# Patient Record
Sex: MALE | Race: WHITE | NOT HISPANIC OR LATINO | Employment: FULL TIME | URBAN - METROPOLITAN AREA
[De-identification: names, ages, dates, MRNs, and addresses within clinical notes are randomized per-mention and may not be internally consistent; named-entity substitution may affect disease eponyms.]

---

## 2017-08-10 ENCOUNTER — TELEPHONE (OUTPATIENT)
Dept: FAMILY MEDICINE | Facility: CLINIC | Age: 57
End: 2017-08-10

## 2017-08-10 DIAGNOSIS — N20.0 RENAL CALCULUS: Primary | ICD-10-CM

## 2017-08-10 DIAGNOSIS — N20.0 RENAL CALCULUS: ICD-10-CM

## 2017-08-10 LAB
ALBUMIN SERPL-MCNC: 4 G/DL (ref 3.4–5)
ALP SERPL-CCNC: 57 U/L (ref 40–150)
ALT SERPL W P-5'-P-CCNC: 38 U/L (ref 0–70)
ANION GAP SERPL CALCULATED.3IONS-SCNC: 9 MMOL/L (ref 3–14)
AST SERPL W P-5'-P-CCNC: 36 U/L (ref 0–45)
BILIRUB SERPL-MCNC: 0.6 MG/DL (ref 0.2–1.3)
BUN SERPL-MCNC: 14 MG/DL (ref 7–30)
CALCIUM SERPL-MCNC: 9.4 MG/DL (ref 8.5–10.1)
CHLORIDE SERPL-SCNC: 104 MMOL/L (ref 94–109)
CO2 SERPL-SCNC: 28 MMOL/L (ref 20–32)
CREAT SERPL-MCNC: 1.07 MG/DL (ref 0.66–1.25)
DEPRECATED CALCIDIOL+CALCIFEROL SERPL-MC: 43 UG/L (ref 20–75)
ERYTHROCYTE [DISTWIDTH] IN BLOOD BY AUTOMATED COUNT: 14.8 % (ref 10–15)
FERRITIN SERPL-MCNC: 12 NG/ML (ref 26–388)
GFR SERPL CREATININE-BSD FRML MDRD: 71 ML/MIN/1.7M2
GLUCOSE SERPL-MCNC: 88 MG/DL (ref 70–99)
HCT VFR BLD AUTO: 46.1 % (ref 40–53)
HGB BLD-MCNC: 14.6 G/DL (ref 13.3–17.7)
IRON SATN MFR SERPL: 19 % (ref 15–46)
IRON SERPL-MCNC: 75 UG/DL (ref 35–180)
MCH RBC QN AUTO: 28.5 PG (ref 26.5–33)
MCHC RBC AUTO-ENTMCNC: 31.7 G/DL (ref 31.5–36.5)
MCV RBC AUTO: 90 FL (ref 78–100)
PLATELET # BLD AUTO: 262 10E9/L (ref 150–450)
POTASSIUM SERPL-SCNC: 4 MMOL/L (ref 3.4–5.3)
PROT SERPL-MCNC: 7.7 G/DL (ref 6.8–8.8)
RBC # BLD AUTO: 5.13 10E12/L (ref 4.4–5.9)
SODIUM SERPL-SCNC: 141 MMOL/L (ref 133–144)
TIBC SERPL-MCNC: 397 UG/DL (ref 240–430)
VIT B12 SERPL-MCNC: 1075 PG/ML (ref 193–986)
WBC # BLD AUTO: 6.2 10E9/L (ref 4–11)

## 2017-08-10 PROCEDURE — 80053 COMPREHEN METABOLIC PANEL: CPT | Performed by: FAMILY MEDICINE

## 2017-08-10 PROCEDURE — 36415 COLL VENOUS BLD VENIPUNCTURE: CPT | Performed by: FAMILY MEDICINE

## 2017-08-10 PROCEDURE — 82607 VITAMIN B-12: CPT | Performed by: FAMILY MEDICINE

## 2017-08-10 PROCEDURE — 83550 IRON BINDING TEST: CPT | Performed by: FAMILY MEDICINE

## 2017-08-10 PROCEDURE — 82306 VITAMIN D 25 HYDROXY: CPT | Performed by: FAMILY MEDICINE

## 2017-08-10 PROCEDURE — 85027 COMPLETE CBC AUTOMATED: CPT | Performed by: FAMILY MEDICINE

## 2017-08-10 PROCEDURE — 82728 ASSAY OF FERRITIN: CPT | Performed by: FAMILY MEDICINE

## 2017-08-10 PROCEDURE — 83540 ASSAY OF IRON: CPT | Performed by: FAMILY MEDICINE

## 2017-08-10 NOTE — TELEPHONE ENCOUNTER
Called patient per Dr. Rebolledo and set up a lab appointment and a physical with him.  No further action is needed.     Yuko Da Silva, DANIELLE

## 2017-08-18 ENCOUNTER — TELEPHONE (OUTPATIENT)
Dept: FAMILY MEDICINE | Facility: CLINIC | Age: 57
End: 2017-08-18

## 2017-08-18 NOTE — TELEPHONE ENCOUNTER
"Reason for Call:  Same Day Appointment, Requested Provider:  Hakeem Rebolledo MD    PCP: Patrice Birch    Reason for visit: work in-physical, patient had an appointment on 08/24 but is unable to make it, requesting to be worked in sometime that week because \"its just a simple physical\" and has already had his blood drawn. Please advise    Duration of symptoms:     Have you been treated for this in the past? Yes    Additional comments:     Can we leave a detailed message on this number? YES    Phone number patient can be reached at: Home number on file [phone] (home)    Best Time: any    Call taken on 8/18/2017 at 8:35 AM by Britt Livingston    "

## 2017-08-18 NOTE — TELEPHONE ENCOUNTER
Patient returned call. Scheduled him for Dr. Rebolledo's next available on 9/19 for physical.     Thank you  Juan Redman  Patient Representative

## 2017-08-18 NOTE — TELEPHONE ENCOUNTER
Called patient and left a voicemail to call the clinic back, when he calls back please let him know that we are booked that week and we really wish he would of kept his appointment as the next opening for a physical is in the middle of September.  Please schedule a physical for the patient as soon as possible.       Yuko Da Silva, DANIELLE

## 2017-09-06 ENCOUNTER — TELEPHONE (OUTPATIENT)
Dept: FAMILY MEDICINE | Facility: CLINIC | Age: 57
End: 2017-09-06

## 2017-09-19 ENCOUNTER — OFFICE VISIT (OUTPATIENT)
Dept: FAMILY MEDICINE | Facility: CLINIC | Age: 57
End: 2017-09-19
Payer: COMMERCIAL

## 2017-09-19 VITALS
TEMPERATURE: 97 F | HEART RATE: 91 BPM | DIASTOLIC BLOOD PRESSURE: 76 MMHG | BODY MASS INDEX: 35.76 KG/M2 | WEIGHT: 249.8 LBS | HEIGHT: 70 IN | SYSTOLIC BLOOD PRESSURE: 110 MMHG | OXYGEN SATURATION: 96 %

## 2017-09-19 DIAGNOSIS — D50.9 IRON DEFICIENCY ANEMIA, UNSPECIFIED IRON DEFICIENCY ANEMIA TYPE: ICD-10-CM

## 2017-09-19 DIAGNOSIS — Z98.84 GASTRIC BYPASS STATUS FOR OBESITY: ICD-10-CM

## 2017-09-19 DIAGNOSIS — Z00.00 ROUTINE GENERAL MEDICAL EXAMINATION AT A HEALTH CARE FACILITY: Primary | ICD-10-CM

## 2017-09-19 PROCEDURE — 99396 PREV VISIT EST AGE 40-64: CPT | Performed by: FAMILY MEDICINE

## 2017-09-19 ASSESSMENT — PAIN SCALES - GENERAL: PAINLEVEL: NO PAIN (0)

## 2017-09-19 NOTE — PROGRESS NOTES
SUBJECTIVE:   CC: Blake Gibbs is an 57 year old male who presents for preventative health visit.     Healthy Habits:    Do you get at least three servings of calcium containing foods daily (dairy, green leafy vegetables, etc.)? yes    Amount of exercise or daily activities, outside of work: 2-3 hour(s) per day    Problems taking medications regularly No    Medication side effects: No    Have you had an eye exam in the past two years? yes    Do you see a dentist twice per year? yes    Do you have sleep apnea, excessive snoring or daytime drowsiness?no              Today's PHQ-2 Score:   PHQ-2 ( 1999 [employer]) 9/19/2017 12/11/2015   Q1: Little interest or pleasure in doing things 0 0   Q2: Feeling down, depressed or hopeless 0 0   PHQ-2 Score 0 0         Abuse: Current or Past(Physical, Sexual or Emotional)- No  Do you feel safe in your environment - Yes  Social History   Substance Use Topics     Smoking status: Former Smoker     Years: 24.00     Smokeless tobacco: Never Used      Comment: quit at 18yrs old     Alcohol use No     The patient does not drink >3 drinks per day nor >7 drinks per week.    Last PSA:   PSA   Date Value Ref Range Status   07/22/2009 0.43 0 - 4 ug/L Final       Reviewed orders with patient. Reviewed health maintenance and updated orders accordingly - Yes      Reviewed and updated as needed this visit by clinical staff  Tobacco  Allergies  Meds         Reviewed and updated as needed this visit by Provider            ROS:  C: NEGATIVE for fever, chills, change in weight  I: NEGATIVE for worrisome rashes, moles or lesions  E: NEGATIVE for vision changes or irritation  ENT: NEGATIVE for ear, mouth and throat problems  R: NEGATIVE for significant cough or SOB  CV: NEGATIVE for chest pain, palpitations or peripheral edema  GI: NEGATIVE for nausea, abdominal pain, heartburn, or change in bowel habits   male: negative for dysuria, hematuria, decreased urinary stream, erectile dysfunction,  "urethral discharge  M: NEGATIVE for significant arthralgias or myalgia  N: NEGATIVE for weakness, dizziness or paresthesias  P: NEGATIVE for changes in mood or affect    OBJECTIVE:   /76 (BP Location: Right arm, Patient Position: Chair, Cuff Size: Adult Regular)  Pulse 91  Temp 97  F (36.1  C) (Temporal)  Ht 5' 9.88\" (1.775 m)  Wt 249 lb 12.8 oz (113.3 kg)  SpO2 96%  BMI 35.96 kg/m2  EXAM:  GENERAL: healthy, alert and no distress  EYES: Eyes grossly normal to inspection, PERRL and conjunctivae and sclerae normal  HENT: ear canals and TM's normal, nose and mouth without ulcers or lesions  NECK: no adenopathy, no asymmetry, masses, or scars and thyroid normal to palpation  RESP: lungs clear to auscultation - no rales, rhonchi or wheezes  CV: regular rate and rhythm, normal S1 S2, no S3 or S4, no murmur, click or rub, no peripheral edema, and peripheral pulses strong  ABDOMEN: soft, nontender, no hepatosplenomegaly, no masses and bowel sounds normal  MS: no gross musculoskeletal defects noted, no edema  SKIN: no suspicious lesions or rashes  NEURO: Normal strength and tone, mentation intact and speech normal  PSYCH: mentation appears normal, affect normal/bright    ASSESSMENT/PLAN:       ICD-10-CM    1. Routine general medical examination at a health care facility Z00.00    2. Iron deficiency anemia, unspecified iron deficiency anemia type D50.9 GASTROENTEROLOGY ADULT REF PROCEDURE ONLY   3. Gastric bypass status for obesity Z98.84        The patient returns for an annual. He is doing well. In point. Immunizations and cancer screening were updated. I would like him to do upper and lower endoscopy due to his history of iron deficiency anemia. This has resolved and may simply be absorption related, but I'm not convinced. He has had negative fecal occult blood testing in the past. He did have a negative colonoscopy in 2010. But a comprehensive workup of undifferentiated iron deficiency anemia and a 57-year-old " "should include endoscopy. He agrees. Continue serial lab monitoring of his bypass status every 4-6 months.  COUNSELING:  Reviewed preventive health counseling, as reflected in patient instructions         reports that he has quit smoking. He quit after 24.00 years of use. He has never used smokeless tobacco.    Estimated body mass index is 35.96 kg/(m^2) as calculated from the following:    Height as of this encounter: 5' 9.88\" (1.775 m).    Weight as of this encounter: 249 lb 12.8 oz (113.3 kg).   They    Counseling Resources:  ATP IV Guidelines  Pooled Cohorts Equation Calculator  FRAX Risk Assessment  ICSI Preventive Guidelines  Dietary Guidelines for Americans, 2010  USDA's MyPlate  ASA Prophylaxis  Lung CA Screening    Hakeem Rebolledo MD  [facility]  "

## 2017-09-19 NOTE — NURSING NOTE
"Chief Complaint   Patient presents with     Physical       Initial /76 (BP Location: Right arm, Patient Position: Chair, Cuff Size: Adult Regular)  Pulse 91  Temp 97  F (36.1  C) (Temporal)  Ht 5' 9.88\" (1.775 m)  Wt 249 lb 12.8 oz (113.3 kg)  SpO2 96%  BMI 35.96 kg/m2 Estimated body mass index is 35.96 kg/(m^2) as calculated from the following:    Height as of this encounter: 5' 9.88\" (1.775 m).    Weight as of this encounter: 249 lb 12.8 oz (113.3 kg).  Medication Reconciliation: complete   Yuko Da Silva CMA    .  Health Maintenance Due   Topic Date Due     HEPATITIS C SCREENING  05/31/1978     ADVANCE DIRECTIVE PLANNING Q5 YRS  05/31/2015     INFLUENZA VACCINE (SYSTEM ASSIGNED)  09/01/2017       Health Maintenance reviewed at today's visit patient asked to schedule/complete:   Patient is aware.       "

## 2017-09-19 NOTE — MR AVS SNAPSHOT
After Visit Summary   9/19/2017    Blake Gibbs    MRN: [id number]           Patient Information     Date Of Birth          1960        Visit Information        Provider Department      9/19/2017 2:00 PM Hakeem Rebolledo MD [facility]        Today's Diagnoses     Routine general medical examination at a health care facility    -  1    Iron deficiency anemia, unspecified iron deficiency anemia type        Gastric bypass status for obesity          Care Instructions      Preventive Health Recommendations  Male Ages 50 - 64    Yearly exam:             See your health care provider every year in order to  o   Review health changes.   o   Discuss preventive care.    o   Review your medicines if your doctor has prescribed any.     Have a cholesterol test every 5 years, or more frequently if you are at risk for high cholesterol/heart disease.     Have a diabetes test (fasting glucose) every three years. If you are at risk for diabetes, you should have this test more often.     Have a colonoscopy at age 50, or have a yearly FIT test (stool test). These exams will check for colon cancer.      Talk with your health care provider about whether or not a prostate cancer screening test (PSA) is right for you.    You should be tested each year for STDs (sexually transmitted diseases), if you re at risk.     Shots: Get a flu shot each year. Get a tetanus shot every 10 years.     Nutrition:    Eat at least 5 servings of fruits and vegetables daily.     Eat whole-grain bread, whole-wheat pasta and brown rice instead of white grains and rice.     Talk to your provider about Calcium and Vitamin D.     Lifestyle    Exercise for at least 150 minutes a week (30 minutes a day, 5 days a week). This will help you control your weight and prevent disease.     Limit alcohol to one drink per day.     No smoking.     Wear sunscreen to prevent skin cancer.     See your dentist every six months for an  exam and cleaning.     See your eye doctor every 1 to 2 years.            Follow-ups after your visit        Additional Services     GASTROENTEROLOGY ADULT REF PROCEDURE ONLY       Last Lab Result: Creatinine (mg/dL)       Date                     Value                 08/10/2017               1.07             ----------  Body mass index is 35.96 kg/(m^2).     Needed:  No  Language:  English    Patient will be contacted to schedule procedure.     Please be aware that coverage of these services is subject to the terms and limitations of your health insurance plan.  Call member services at your health plan with any benefit or coverage questions.  Any procedures must be performed at a [facility] facility OR coordinated by your clinic's referral office.    Please bring the following with you to your appointment:    (1) Any X-Rays, CTs or MRIs which have been performed.  Contact the facility where they were done to arrange for  prior to your scheduled appointment.    (2) List of current medications   (3) This referral request   (4) Any documents/labs given to you for this referral                  Future tests that were ordered for you today     Open Future Orders        Priority Expected Expires Ordered    Hemoglobin Routine  9/19/2018 9/19/2017    Basic metabolic panel Routine  9/19/2018 9/19/2017    Vitamin B12 Routine  9/19/2018 9/19/2017    Vitamin D Deficiency Routine  9/19/2018 9/19/2017            Who to contact     If you have questions or need follow up information about today's clinic visit or your schedule please contact [facility] directly at [phone].  Normal or non-critical lab and imaging results will be communicated to you by MyChart, letter or phone within 4 business days after the clinic has received the results. If you do not hear from us within 7 days, please contact the clinic through MyChart or phone. If you have a critical or abnormal lab result, we will notify  "you by phone as soon as possible.  Submit refill requests through [employer] or call your pharmacy and they will forward the refill request to us. Please allow 3 business days for your refill to be completed.          Additional Information About Your Visit        [employer] [place] Information     [employer] lets you send messages to your doctor, view your test results, renew your prescriptions, schedule appointments and more. To sign up, go to [email or web address] . Click on \"Log in\" on the left side of the screen, which will take you to the Welcome page. Then click on \"Sign up Now\" on the right side of the page.     You will be asked to enter the access code listed below, as well as some personal information. Please follow the directions to create your username and password.     Your access code is: [email or web address]  Expires: 2017  4:10 PM     Your access code will  in 90 days. If you need help or a new code, please call your [place] clinic or [phone].        Care EveryWhere ID     This is your Care EveryWhere ID. This could be used by other organizations to access your [place] medical records  [id number]        Your Vitals Were     Pulse Temperature Height Pulse Oximetry BMI (Body Mass Index)       91 97  F (36.1  C) (Temporal) 5' 9.88\" (1.775 m) 96% 35.96 kg/m2        Blood Pressure from Last 3 Encounters:   17 110/76   16 118/80   12/11/15 106/70    Weight from Last 3 Encounters:   17 249 lb 12.8 oz (113.3 kg)   16 265 lb (120.2 kg)   12/11/15 260 lb 6.4 oz (118.1 kg)              We Performed the Following     GASTROENTEROLOGY ADULT REF PROCEDURE ONLY          Today's Medication Changes          These changes are accurate as of: 17  4:10 PM.  If you have any questions, ask your nurse or doctor.               Stop taking these medicines if you haven't already. Please contact your care team if you have questions.     atovaquone-proguanil 250-100 MG per tablet   Commonly known as: "  MALARONE   Stopped by:  Hakeem Rebolledo MD           ferrous gluconate 324 (38 FE) MG tablet   Commonly known as:  FERGON   Stopped by:  Hakeem Rebolledo MD                    Primary Care Provider Office Phone # Fax #    Patrice Birch [phone]       [facility] [address] [place] [address]        Equal Access to Services     [facility]: Hadii aad ku hadasho Soomaali, waaxda luqadaha, qaybta kaalmada adeegyada, waxay idiin hayaan adeeg kharash la'aan ah. So [facility] [phone].    ATENCIÓN: Si harsh langford, tiene a hernandez disposición servicios gratuitos de asistencia lingüística. Llame al [phone].    We comply with applicable federal civil rights laws and [place] laws. We do not discriminate on the basis of race, color, national origin, age, disability sex, sexual orientation or gender identity.            Thank you!     Thank you for choosing [facility]  for your care. Our goal is always to provide you with excellent care. Hearing back from our patients is one way we can continue to improve our services. Please take a few minutes to complete the written survey that you may receive in the mail after your visit with us. Thank you!             Your Updated Medication List - Protect others around you: Learn how to safely use, store and throw away your medicines at www.disposemymeds.org.          This list is accurate as of: 9/19/17  4:10 PM.  Always use your most recent med list.                   Brand Name Dispense Instructions for use Diagnosis    aspirin 81 MG EC tablet      Take 1 tablet (81 mg) by mouth daily        Calcium-Vitamin D3 600-500 MG-UNIT Caps           cyabnocobalamin 2500 MCG sublingual tablet    VITAMIN B-12     Place 2,500 mcg under the tongue daily        Glucosamine Chondroit-Collagen Caps           MULTIVITAMINS PO      Take  by mouth.        omeprazole 40 MG capsule    priLOSEC    90 capsule    Take 1 capsule  (40 mg) by mouth daily    Gastroesophageal reflux disease without esophagitis       vitamin E 400 UNIT capsule      Take by mouth daily

## 2017-09-21 ENCOUNTER — TELEPHONE (OUTPATIENT)
Dept: FAMILY MEDICINE | Facility: CLINIC | Age: 57
End: 2017-09-21

## 2017-09-21 NOTE — TELEPHONE ENCOUNTER
Left message for patient to return call to schedule colonoscopy or EGD. If Mary or Luz Maria are unavailable, please transfer to the surgery center.     OK to schedule with alisa or Mendez

## 2017-09-22 NOTE — TELEPHONE ENCOUNTER
Called patient to schedule colonoscopy and EGD. Patients father just passed away and he will call when he is ready.

## 2018-02-01 ENCOUNTER — OFFICE VISIT (OUTPATIENT)
Dept: FAMILY MEDICINE | Facility: CLINIC | Age: 58
End: 2018-02-01
Payer: COMMERCIAL

## 2018-02-01 VITALS
TEMPERATURE: 98 F | WEIGHT: 249.2 LBS | HEART RATE: 87 BPM | OXYGEN SATURATION: 96 % | BODY MASS INDEX: 34.89 KG/M2 | SYSTOLIC BLOOD PRESSURE: 120 MMHG | DIASTOLIC BLOOD PRESSURE: 82 MMHG | HEIGHT: 71 IN

## 2018-02-01 DIAGNOSIS — R50.9 FEVER, UNSPECIFIED FEVER CAUSE: ICD-10-CM

## 2018-02-01 DIAGNOSIS — H10.31 ACUTE CONJUNCTIVITIS OF RIGHT EYE, UNSPECIFIED ACUTE CONJUNCTIVITIS TYPE: ICD-10-CM

## 2018-02-01 DIAGNOSIS — J18.9 ATYPICAL PNEUMONIA: Primary | ICD-10-CM

## 2018-02-01 LAB
FLUAV+FLUBV AG SPEC QL: NEGATIVE
FLUAV+FLUBV AG SPEC QL: NEGATIVE
SPECIMEN SOURCE: NORMAL

## 2018-02-01 PROCEDURE — 87804 INFLUENZA ASSAY W/OPTIC: CPT | Performed by: FAMILY MEDICINE

## 2018-02-01 RX ORDER — AZITHROMYCIN 250 MG/1
TABLET, FILM COATED ORAL
Qty: 6 TABLET | Refills: 0 | Status: SHIPPED | OUTPATIENT
Start: 2018-02-01 | End: 2018-09-12

## 2018-02-01 RX ORDER — POLYMYXIN B SULFATE AND TRIMETHOPRIM 1; 10000 MG/ML; [USP'U]/ML
1 SOLUTION OPHTHALMIC
Qty: 1 BOTTLE | Refills: 0 | Status: SHIPPED | OUTPATIENT
Start: 2018-02-01 | End: 2018-02-08

## 2018-02-01 RX ORDER — PREDNISONE 20 MG/1
20 TABLET ORAL DAILY
Qty: 5 TABLET | Refills: 0 | Status: SHIPPED | OUTPATIENT
Start: 2018-02-01 | End: 2018-09-12

## 2018-02-01 ASSESSMENT — PAIN SCALES - GENERAL: PAINLEVEL: NO PAIN (0)

## 2018-02-01 NOTE — PROGRESS NOTES
"  SUBJECTIVE:                                                    Blake Gibbs is a 57 year old male who presents to clinic today for the following health issues:    Chief Complaint   Patient presents with     Cough        Acute Illness   Acute illness concerns: cough  Onset: 1 weeks    Fever: YES- low grade    Chills/Sweats: YES    Headache (location?): YES    Sinus Pressure:YES    Conjunctivitis:  YES: right and is also red slight swelling on lower lid    Ear Pain: YES- pressure    Rhinorrhea: YES    Congestion: YES    Sore Throat: YES     Cough: YES-productive of green sputum    Wheeze: YES- sometimes    Decreased Appetite: YES    Nausea: no     Vomiting: no    Diarrhea:  no    Dysuria/Freq.: no    Fatigue/Achiness: YES    Sick/Strep Exposure: YES- grandkids are sick     Therapies Tried and outcome: n/a    Prod cough for past week, +chills, not getting much better,     ROS:  Constitutional, HEENT, cardiovascular, pulmonary, gi and gu systems are negative, except as otherwise noted.    OBJECTIVE:                                                    /82 (BP Location: Right arm, Patient Position: Chair, Cuff Size: Adult Regular)  Pulse 87  Temp 98  F (36.7  C) (Temporal)  Ht 5' 10.7\" (1.796 m)  Wt 249 lb 3.2 oz (113 kg)  SpO2 96%  BMI 35.05 kg/m2  Body mass index is 35.05 kg/(m^2).    Well-appearing.  Good historian.  Alert and oriented.  Neck supple lymphadenopathy or thyromegaly.  Heart regular.  Lungs have a right sided extra wheezes with rhonchi, left side normal.  No increased work of breathing.  Extremities warm well perfused no edema.    Diagnostic Test Results:  none      ASSESSMENT/PLAN:                                                        ICD-10-CM    1. Atypical pneumonia J18.9 predniSONE (DELTASONE) 20 MG tablet     azithromycin (ZITHROMAX) 250 MG tablet   2. Acute conjunctivitis of right eye, unspecified acute conjunctivitis type H10.31 trimethoprim-polymyxin b (POLYTRIM) ophthalmic " solution   3. Fever R50.9 Influenza A/B antigen       Influenza negative.  Lung exam suggest atypical pneumonia.  He will be placed on azithromycin and prednisone.  He also has conjunctivitis of the right eye and placed on drops.  Other conservative measures discussed.  Return if failing to improve or worsening at any point.  He agrees.    I waive my professional fees for this appt    Hakeem Rebolledo MD  [facility]

## 2018-02-01 NOTE — MR AVS SNAPSHOT
"              After Visit Summary   2018    Blake Gibbs    MRN: [id number]           Patient Information     Date Of Birth          1960        Visit Information        Provider Department      2018 10:20 AM Hakeem Rebolledo MD [facility]        Today's Diagnoses     Atypical pneumonia    -  1    Acute conjunctivitis of right eye, unspecified acute conjunctivitis type        Fever           Follow-ups after your visit        Who to contact     If you have questions or need follow up information about today's clinic visit or your schedule please contact [facility] directly at [phone].  Normal or non-critical lab and imaging results will be communicated to you by TRDatahart, letter or phone within 4 business days after the clinic has received the results. If you do not hear from us within 7 days, please contact the clinic through TRDatahart or phone. If you have a critical or abnormal lab result, we will notify you by phone as soon as possible.  Submit refill requests through Access Mobile or call your pharmacy and they will forward the refill request to us. Please allow 3 business days for your refill to be completed.          Additional Information About Your Visit        MyChart Information     Access Mobile lets you send messages to your doctor, view your test results, renew your prescriptions, schedule appointments and more. To sign up, go to [email or web address] . Click on \"Log in\" on the left side of the screen, which will take you to the Welcome page. Then click on \"Sign up Now\" on the right side of the page.     You will be asked to enter the access code listed below, as well as some personal information. Please follow the directions to create your username and password.     Your access code is: [email or web address]  Expires: 2018  1:19 PM     Your access code will  in 90 days. If you need help or a new code, please call your [facility] or [phone].   " "     Care EveryWhere ID     This is your Care EveryWhere ID. This could be used by other organizations to access your [facility] medical records  [id number]        Your Vitals Were     Pulse Temperature Height Pulse Oximetry BMI (Body Mass Index)       87 98  F (36.7  C) (Temporal) 5' 10.7\" (1.796 m) 96% 35.05 kg/m2        Blood Pressure from Last 3 Encounters:   02/01/18 120/82   09/19/17 110/76   03/08/16 118/80    Weight from Last 3 Encounters:   02/01/18 249 lb 3.2 oz (113 kg)   09/19/17 249 lb 12.8 oz (113.3 kg)   03/08/16 265 lb (120.2 kg)              We Performed the Following     Influenza A/B antigen          Today's Medication Changes          These changes are accurate as of 2/1/18  1:19 PM.  If you have any questions, ask your nurse or doctor.               Start taking these medicines.        Dose/Directions    azithromycin 250 MG tablet   Commonly known as:  ZITHROMAX   Used for:  Atypical pneumonia   Started by:  Hakeem Rebolledo MD        Two tablets first day, then one tablet daily for four days.   Quantity:  6 tablet   Refills:  0       predniSONE 20 MG tablet   Commonly known as:  DELTASONE   Used for:  Atypical pneumonia   Started by:  Hakeem Rebolledo MD        Dose:  20 mg   Take 1 tablet (20 mg) by mouth daily   Quantity:  5 tablet   Refills:  0       trimethoprim-polymyxin b ophthalmic solution   Commonly known as:  POLYTRIM   Used for:  Acute conjunctivitis of right eye, unspecified acute conjunctivitis type   Started by:  Hakeem Rebolledo MD        Dose:  1 drop   Apply 1 drop to eye every 3 hours for 7 days   Quantity:  1 Bottle   Refills:  0            Where to get your medicines      These medications were sent to [facility] [place]  [place] - [address]  [address], [place] [address]     Phone:  [phone]     azithromycin 250 MG tablet    predniSONE 20 MG tablet    trimethoprim-polymyxin b ophthalmic solution                Primary Care " Provider Office Phone # Fax #    Patrice Birch [phone]       [facility] [address] [place] [address]        Equal Access to Services     ELVIA MORGAN : Tulio rondon andersono Sofaridaali, waaxda luqadaha, qaybta kaalmada aderosendo, devon little laKristanabel crowell. So [facility] [phone].    ATENCIÓN: Si habla español, tiene a hernandez disposición servicios gratuitos de asistencia lingüística. Llame al [phone].    We comply with applicable federal civil rights laws and [place] laws. We do not discriminate on the basis of race, color, national origin, age, disability, sex, sexual orientation, or gender identity.            Thank you!     Thank you for choosing [facility]  for your care. Our goal is always to provide you with excellent care. Hearing back from our patients is one way we can continue to improve our services. Please take a few minutes to complete the written survey that you may receive in the mail after your visit with us. Thank you!             Your Updated Medication List - Protect others around you: Learn how to safely use, store and throw away your medicines at www.disposemymeds.org.          This list is accurate as of 2/1/18  1:19 PM.  Always use your most recent med list.                   Brand Name Dispense Instructions for use Diagnosis    aspirin 81 MG EC tablet      Take 1 tablet (81 mg) by mouth daily        azithromycin 250 MG tablet    ZITHROMAX    6 tablet    Two tablets first day, then one tablet daily for four days.    Atypical pneumonia       Calcium-Vitamin D3 600-500 MG-UNIT Caps           cyanocobalamin 2500 MCG sublingual tablet    VITAMIN B-12     Place 2,500 mcg under the tongue daily        Glucosamine Chondroit-Collagen Caps           MULTIVITAMINS PO      Take  by mouth.        omeprazole 40 MG capsule    priLOSEC    90 capsule    Take 1 capsule (40 mg) by mouth daily    Gastroesophageal reflux disease  without esophagitis       predniSONE 20 MG tablet    DELTASONE    5 tablet    Take 1 tablet (20 mg) by mouth daily    Atypical pneumonia       trimethoprim-polymyxin b ophthalmic solution    POLYTRIM    1 Bottle    Apply 1 drop to eye every 3 hours for 7 days    Acute conjunctivitis of right eye, unspecified acute conjunctivitis type       vitamin E 400 UNIT capsule      Take by mouth daily

## 2018-02-01 NOTE — NURSING NOTE
"Chief Complaint   Patient presents with     Cough       Initial /82 (BP Location: Right arm, Patient Position: Chair, Cuff Size: Adult Regular)  Pulse 87  Temp 98  F (36.7  C) (Temporal)  Ht 5' 10.7\" (1.796 m)  Wt 249 lb 3.2 oz (113 kg)  SpO2 96%  BMI 35.05 kg/m2 Estimated body mass index is 35.05 kg/(m^2) as calculated from the following:    Height as of this encounter: 5' 10.7\" (1.796 m).    Weight as of this encounter: 249 lb 3.2 oz (113 kg).  Medication Reconciliation: complete   Yuko Da Silva CMA    Health Maintenance Due   Topic Date Due     HEPATITIS C SCREENING  05/31/1978     ADVANCE DIRECTIVE PLANNING Q5 YRS  05/31/2015     INFLUENZA VACCINE (SYSTEM ASSIGNED)  09/01/2017       Health Maintenance reviewed at today's visit patient asked to schedule/complete:   Patient is aware.      "

## 2018-02-12 ENCOUNTER — TELEPHONE (OUTPATIENT)
Dept: FAMILY MEDICINE | Facility: CLINIC | Age: 58
End: 2018-02-12

## 2018-02-12 ENCOUNTER — HOSPITAL ENCOUNTER (OUTPATIENT)
Dept: GENERAL RADIOLOGY | Facility: CLINIC | Age: 58
Discharge: HOME | End: 2018-02-12
Attending: FAMILY MEDICINE | Admitting: FAMILY MEDICINE
Payer: COMMERCIAL

## 2018-02-12 ENCOUNTER — OFFICE VISIT (OUTPATIENT)
Dept: FAMILY MEDICINE | Facility: CLINIC | Age: 58
End: 2018-02-12
Payer: COMMERCIAL

## 2018-02-12 VITALS
TEMPERATURE: 99.1 F | OXYGEN SATURATION: 96 % | SYSTOLIC BLOOD PRESSURE: 138 MMHG | HEART RATE: 86 BPM | RESPIRATION RATE: 14 BRPM | WEIGHT: 255 LBS | DIASTOLIC BLOOD PRESSURE: 74 MMHG | BODY MASS INDEX: 35.87 KG/M2

## 2018-02-12 DIAGNOSIS — R05.9 COUGH: ICD-10-CM

## 2018-02-12 DIAGNOSIS — R05.9 COUGH: Primary | ICD-10-CM

## 2018-02-12 DIAGNOSIS — J06.9 ACUTE URI: ICD-10-CM

## 2018-02-12 LAB
CRP SERPL-MCNC: <2.9 MG/L (ref 0–8)
ERYTHROCYTE [DISTWIDTH] IN BLOOD BY AUTOMATED COUNT: 14.1 % (ref 10–15)
FLUAV+FLUBV AG SPEC QL: NEGATIVE
FLUAV+FLUBV AG SPEC QL: POSITIVE
HCT VFR BLD AUTO: 40.1 % (ref 40–53)
HGB BLD-MCNC: 13.4 G/DL (ref 13.3–17.7)
MCH RBC QN AUTO: 28.5 PG (ref 26.5–33)
MCHC RBC AUTO-ENTMCNC: 33.4 G/DL (ref 31.5–36.5)
MCV RBC AUTO: 85 FL (ref 78–100)
PLATELET # BLD AUTO: 263 10E9/L (ref 150–450)
RBC # BLD AUTO: 4.71 10E12/L (ref 4.4–5.9)
SPECIMEN SOURCE: ABNORMAL
WBC # BLD AUTO: 4.8 10E9/L (ref 4–11)

## 2018-02-12 PROCEDURE — 85027 COMPLETE CBC AUTOMATED: CPT | Performed by: FAMILY MEDICINE

## 2018-02-12 PROCEDURE — 87804 INFLUENZA ASSAY W/OPTIC: CPT | Performed by: FAMILY MEDICINE

## 2018-02-12 PROCEDURE — 86140 C-REACTIVE PROTEIN: CPT | Performed by: FAMILY MEDICINE

## 2018-02-12 PROCEDURE — 71046 X-RAY EXAM CHEST 2 VIEWS: CPT | Mod: TC

## 2018-02-12 PROCEDURE — 36415 COLL VENOUS BLD VENIPUNCTURE: CPT | Performed by: FAMILY MEDICINE

## 2018-02-12 PROCEDURE — 99213 OFFICE O/P EST LOW 20 MIN: CPT | Performed by: FAMILY MEDICINE

## 2018-02-12 RX ORDER — PREDNISONE 20 MG/1
20 TABLET ORAL DAILY
Qty: 5 TABLET | Refills: 0 | Status: SHIPPED | OUTPATIENT
Start: 2018-02-12 | End: 2018-09-12

## 2018-02-12 RX ORDER — BENZONATATE 200 MG/1
200 CAPSULE ORAL 3 TIMES DAILY PRN
Qty: 21 CAPSULE | Refills: 0 | Status: SHIPPED | OUTPATIENT
Start: 2018-02-12 | End: 2018-09-12

## 2018-02-12 ASSESSMENT — PAIN SCALES - GENERAL: PAINLEVEL: NO PAIN (0)

## 2018-02-12 NOTE — NURSING NOTE
"Chief Complaint   Patient presents with     Cough       Initial /74 (BP Location: Right arm, Patient Position: Sitting, Cuff Size: Adult Regular)  Pulse 86  Temp 99.1  F (37.3  C) (Temporal)  Resp 14  Wt 255 lb (115.7 kg)  SpO2 96%  BMI 35.87 kg/m2 Estimated body mass index is 35.87 kg/(m^2) as calculated from the following:    Height as of 2/1/18: 5' 10.7\" (1.796 m).    Weight as of this encounter: 255 lb (115.7 kg).  Medication Reconciliation: complete     Natalia Madrigal MA 2/12/2018      "

## 2018-02-12 NOTE — PROGRESS NOTES
SUBJECTIVE:   Blake Gibbs is a 57 year old male who presents to clinic today for the following health issues:    Acute Illness   Acute illness concerns: cough   Onset: 3 weeks ago    Fever: YES    Chills/Sweats: YES    Headache (location?): YES    Sinus Pressure:no    Conjunctivitis:  NO    Ear Pain: YES: both pressure    Rhinorrhea: YES    Congestion: YES    Sore Throat: no      Cough: YES    Wheeze: YES    Decreased Appetite: no     Nausea: no     Vomiting: no     Diarrhea:  no     Dysuria/Freq.: no     Fatigue/Achiness: YES    Sick/Strep Exposure: no     Therapies Tried and outcome: antibiotic already helped but came back       Patient treated recently with azithromycin and prednisone for community acquired pneumonia, now returns with chills.  No fever.  Fatigue.  Works as a local .  Was very busy last week and maybe overdid it.  Some chest tightness.  Nonexertional.  Tightness worse with coughing.    ROS:  Constitutional, HEENT, cardiovascular, pulmonary, gi and gu systems are negative, except as otherwise noted.    OBJECTIVE:     /74 (BP Location: Right arm, Patient Position: Sitting, Cuff Size: Adult Regular)  Pulse 86  Temp 99.1  F (37.3  C) (Temporal)  Resp 14  Wt 255 lb (115.7 kg)  SpO2 96%  BMI 35.87 kg/m2  Body mass index is 35.87 kg/(m^2).  GENERAL: healthy, alert and no distress  NECK: no adenopathy, no asymmetry, masses, or scars and thyroid normal to palpation  RESP: lungs clear to auscultation - no rales, rhonchi or wheezes  CV: regular rate and rhythm, normal S1 S2, no S3 or S4, no murmur, click or rub, no peripheral edema and peripheral pulses strong  ABDOMEN: soft, nontender, no hepatosplenomegaly, no masses and bowel sounds normal  MS: no gross musculoskeletal defects noted, no edema    Diagnostic Test Results:  none     ASSESSMENT/PLAN:               ICD-10-CM    1. Cough R05 **CBC with platelets FUTURE anytime     Influenza A/B antigen     XR Chest 2 Views     CRP,  inflammation     **CBC with platelets FUTURE anytime     CRP, inflammation     Influenza A/B antigen     benzonatate (TESSALON) 200 MG capsule     predniSONE (DELTASONE) 20 MG tablet     CANCELED: CRP, inflammation   2. Acute URI J06.9        Recheck of his cough to make sure he does not have recurrent pneumonia.  Blood work came back unremarkable.  Negative influenza.  I will send him symptomatic care Tessalon and some prednisone for what I think is bronchitis causing his chest tightness.  Discussed signs of worsening.  Follow-up that occurs    Hakeem Rebolledo MD  [facility]

## 2018-02-12 NOTE — TELEPHONE ENCOUNTER
Patient was put on [facility] schedule today at 1pm for cough, he needs to arrive 10 to 15 minutes early to go to lab and xray.    If patient calls back please let Natalia know if he is coming.

## 2018-02-12 NOTE — MR AVS SNAPSHOT
"              After Visit Summary   2018    Blake Gibbs    MRN: [id number]           Patient Information     Date Of Birth          1960        Visit Information        Provider Department      2018 1:00 PM Hakeem Rebolledo MD [employer] [facility]        Today's Diagnoses     Cough    -  1    Acute URI           Follow-ups after your visit        Who to contact     If you have questions or need follow up information about today's clinic visit or your schedule please contact [facility] directly at [phone].  Normal or non-critical lab and imaging results will be communicated to you by HipLogichart, letter or phone within 4 business days after the clinic has received the results. If you do not hear from us within 7 days, please contact the clinic through HipLogichart or phone. If you have a critical or abnormal lab result, we will notify you by phone as soon as possible.  Submit refill requests through Strix Systems or call your pharmacy and they will forward the refill request to us. Please allow 3 business days for your refill to be completed.          Additional Information About Your Visit        MyChart Information     Strix Systems lets you send messages to your doctor, view your test results, renew your prescriptions, schedule appointments and more. To sign up, go to [email or web address] . Click on \"Log in\" on the left side of the screen, which will take you to the Welcome page. Then click on \"Sign up Now\" on the right side of the page.     You will be asked to enter the access code listed below, as well as some personal information. Please follow the directions to create your username and password.     Your access code is: [email or web address]  Expires: 2018  1:19 PM     Your access code will  in 90 days. If you need help or a new code, please call your [facility] or [phone].        Care EveryWhere ID     This is your Care EveryWhere ID. This could be used by other " organizations to access your [facility] medical records  [id number]        Your Vitals Were     Pulse Temperature Respirations Pulse Oximetry BMI (Body Mass Index)       86 99.1  F (37.3  C) (Temporal) 14 96% 35.87 kg/m2        Blood Pressure from Last 3 Encounters:   02/12/18 138/74   02/01/18 120/82   09/19/17 110/76    Weight from Last 3 Encounters:   02/12/18 255 lb (115.7 kg)   02/01/18 249 lb 3.2 oz (113 kg)   09/19/17 249 lb 12.8 oz (113.3 kg)              We Performed the Following     **CBC with platelets FUTURE anytime     CRP, inflammation     Influenza A/B antigen          Today's Medication Changes          These changes are accurate as of 2/12/18 11:59 PM.  If you have any questions, ask your nurse or doctor.               Start taking these medicines.        Dose/Directions    benzonatate 200 MG capsule   Commonly known as:  TESSALON   Used for:  Cough   Started by:  Hakeem Rebolledo MD        Dose:  200 mg   Take 1 capsule (200 mg) by mouth 3 times daily as needed for cough   Quantity:  21 capsule   Refills:  0         These medicines have changed or have updated prescriptions.        Dose/Directions    * predniSONE 20 MG tablet   Commonly known as:  DELTASONE   This may have changed:  Another medication with the same name was added. Make sure you understand how and when to take each.   Used for:  Atypical pneumonia   Changed by:  Hakeem Rebolledo MD        Dose:  20 mg   Take 1 tablet (20 mg) by mouth daily   Quantity:  5 tablet   Refills:  0       * predniSONE 20 MG tablet   Commonly known as:  DELTASONE   This may have changed:  You were already taking a medication with the same name, and this prescription was added. Make sure you understand how and when to take each.   Used for:  Cough   Changed by:  Hakeem Rebolledo MD        Dose:  20 mg   Take 1 tablet (20 mg) by mouth daily   Quantity:  5 tablet   Refills:  0       * Notice:  This list has 2 medication(s) that are the same  as other medications prescribed for you. Read the directions carefully, and ask your doctor or other care provider to review them with you.         Where to get your medicines      These medications were sent to [id number] - [place], [place] - [address]  [address], [facility] [address]     Phone:  [phone]     benzonatate 200 MG capsule    predniSONE 20 MG tablet                Primary Care Provider Office Phone # Fax #    Patrice Birch [phone]       [facility] [address] [place] [address]        Equal Access to Services     [facility]: Hadii aad ku hadasho Soomaali, waaxda luqadaha, qaybta kaalmada adeegyada, waxay axelin hayjennifern adesandra jauregui . So [facility] [phone].    ATENCIÓN: Si habla español, tiene a hernandez disposición servicios gratuitos de asistencia lingüística. [facility] [phone].    We comply with applicable federal civil rights laws and [place] laws. We do not discriminate on the basis of race, color, national origin, age, disability, sex, sexual orientation, or gender identity.            Thank you!     Thank you for choosing [facility]  for your care. Our goal is always to provide you with excellent care. Hearing back from our patients is one way we can continue to improve our services. Please take a few minutes to complete the written survey that you may receive in the mail after your visit with us. Thank you!             Your Updated Medication List - Protect others around you: Learn how to safely use, store and throw away your medicines at www.disposemymeds.org.          This list is accurate as of 2/12/18 11:59 PM.  Always use your most recent med list.                   Brand Name Dispense Instructions for use Diagnosis    aspirin 81 MG EC tablet      Take 1 tablet (81 mg) by mouth daily        azithromycin 250 MG tablet    ZITHROMAX    6 tablet    Two tablets first day, then one tablet daily for  four days.    Atypical pneumonia       benzonatate 200 MG capsule    TESSALON    21 capsule    Take 1 capsule (200 mg) by mouth 3 times daily as needed for cough    Cough       Calcium-Vitamin D3 600-500 MG-UNIT Caps           cyanocobalamin 2500 MCG sublingual tablet    VITAMIN B-12     Place 2,500 mcg under the tongue daily        Glucosamine Chondroit-Collagen Caps           MULTIVITAMINS PO      Take  by mouth.        omeprazole 40 MG capsule    priLOSEC    90 capsule    Take 1 capsule (40 mg) by mouth daily    Gastroesophageal reflux disease without esophagitis       * predniSONE 20 MG tablet    DELTASONE    5 tablet    Take 1 tablet (20 mg) by mouth daily    Atypical pneumonia       * predniSONE 20 MG tablet    DELTASONE    5 tablet    Take 1 tablet (20 mg) by mouth daily    Cough       vitamin E 400 UNIT capsule      Take by mouth daily        * Notice:  This list has 2 medication(s) that are the same as other medications prescribed for you. Read the directions carefully, and ask your doctor or other care provider to review them with you.

## 2018-04-09 ENCOUNTER — TELEPHONE (OUTPATIENT)
Dept: FAMILY MEDICINE | Facility: CLINIC | Age: 58
End: 2018-04-09

## 2018-04-09 DIAGNOSIS — D50.9 IRON DEFICIENCY ANEMIA, UNSPECIFIED IRON DEFICIENCY ANEMIA TYPE: Primary | ICD-10-CM

## 2018-04-11 ENCOUNTER — TELEPHONE (OUTPATIENT)
Dept: FAMILY MEDICINE | Facility: CLINIC | Age: 58
End: 2018-04-11

## 2018-04-11 NOTE — TELEPHONE ENCOUNTER
Called patient to schedule procedures. Patient states that he wants to have this done in [place]. I transferred patient to [place] to schedule.

## 2018-09-12 ENCOUNTER — TELEPHONE (OUTPATIENT)
Dept: FAMILY MEDICINE | Facility: CLINIC | Age: 58
End: 2018-09-12

## 2018-09-12 ENCOUNTER — OFFICE VISIT (OUTPATIENT)
Dept: FAMILY MEDICINE | Facility: CLINIC | Age: 58
End: 2018-09-12
Payer: COMMERCIAL

## 2018-09-12 VITALS
HEART RATE: 79 BPM | DIASTOLIC BLOOD PRESSURE: 70 MMHG | BODY MASS INDEX: 35.87 KG/M2 | SYSTOLIC BLOOD PRESSURE: 130 MMHG | OXYGEN SATURATION: 97 % | WEIGHT: 255 LBS | TEMPERATURE: 97.8 F | RESPIRATION RATE: 16 BRPM

## 2018-09-12 DIAGNOSIS — Z98.84 GASTRIC BYPASS STATUS FOR OBESITY: Primary | ICD-10-CM

## 2018-09-12 DIAGNOSIS — D64.9 ANEMIA, UNSPECIFIED TYPE: ICD-10-CM

## 2018-09-12 DIAGNOSIS — E66.01 MORBID OBESITY (H): ICD-10-CM

## 2018-09-12 DIAGNOSIS — K21.9 GASTROESOPHAGEAL REFLUX DISEASE WITHOUT ESOPHAGITIS: ICD-10-CM

## 2018-09-12 PROCEDURE — 99214 OFFICE O/P EST MOD 30 MIN: CPT | Performed by: FAMILY MEDICINE

## 2018-09-12 RX ORDER — OMEPRAZOLE 40 MG/1
40 CAPSULE, DELAYED RELEASE ORAL DAILY
Qty: 90 CAPSULE | Refills: 3 | Status: SHIPPED | OUTPATIENT
Start: 2018-09-12 | End: 2019-05-29

## 2018-09-12 ASSESSMENT — PAIN SCALES - GENERAL: PAINLEVEL: NO PAIN (0)

## 2018-09-12 NOTE — TELEPHONE ENCOUNTER
Called [place] [facility] to see if this is the place that I am able to send this patient.  Message left for them to return my call.   Lisset SCHAFFER

## 2018-09-12 NOTE — MR AVS SNAPSHOT
After Visit Summary   9/12/2018    Blake Gibbs    MRN: [id number]           Patient Information     Date Of Birth          1960        Visit Information        Provider Department      9/12/2018 9:40 AM Hakeem Rebolledo MD [facility]        Today's Diagnoses     Gastric bypass status for obesity    -  1    Gastroesophageal reflux disease without esophagitis        Morbid obesity (H)        Anemia, unspecified type           Follow-ups after your visit        Additional Services     GASTROENTEROLOGY ADULT REF PROCEDURE ONLY [facility] [phone]; No Provider Preference (but wants MG only)       Last Lab Result: Creatinine (mg/dL)       Date                     Value                 08/10/2017               1.07             ----------  Body mass index is 35.87 kg/(m^2).     Needed:  No  Language:  English    Patient will be contacted to schedule procedure.     Please be aware that coverage of these services is subject to the terms and limitations of your health insurance plan.  Call member services at your health plan with any benefit or coverage questions.  Any procedures must be performed at a [place] facility OR coordinated by your clinic's referral office.    Please bring the following with you to your appointment:    (1) Any X-Rays, CTs or MRIs which have been performed.  Contact the facility where they were done to arrange for  prior to your scheduled appointment.    (2) List of current medications   (3) This referral request   (4) Any documents/labs given to you for this referral                  Who to contact     If you have questions or need follow up information about today's clinic visit or your schedule please contact [facility] directly at [phone].  Normal or non-critical lab and imaging results will be communicated to you by MyChart, letter or phone within 4 business days after the clinic has received the  "results. If you do not hear from us within 7 days, please contact the clinic through KRAFTWERK or phone. If you have a critical or abnormal lab result, we will notify you by phone as soon as possible.  Submit refill requests through [employer]ERK or call your pharmacy and they will forward the refill request to us. Please allow 3 business days for your refill to be completed.          Additional Information About Your Visit        KRAFTWERK Information     KRAFTWERK lets you send messages to your doctor, view your test results, renew your prescriptions, schedule appointments and more. To sign up, go to [email or web address] . Click on \"Log in\" on the left side of the screen, which will take you to the Welcome page. Then click on \"Sign up Now\" on the right side of the page.     You will be asked to enter the access code listed below, as well as some personal information. Please follow the directions to create your username and password.     Your access code is: [email or web address]  Expires: 2018  1:17 PM     Your access code will  in 90 days. If you need help or a new code, please call your [place] clinic or [phone].        Care EveryWhere ID     This is your Care EveryWhere ID. This could be used by other organizations to access your [place] medical records  [id number]        Your Vitals Were     Pulse Temperature Respirations Pulse Oximetry BMI (Body Mass Index)       79 97.8  F (36.6  C) (Temporal) 16 97% 35.87 kg/m2        Blood Pressure from Last 3 Encounters:   18 130/70   18 138/74   18 120/82    Weight from Last 3 Encounters:   18 255 lb (115.7 kg)   18 255 lb (115.7 kg)   18 249 lb 3.2 oz (113 kg)              We Performed the Following     GASTROENTEROLOGY ADULT REF PROCEDURE ONLY [facility] [phone]; No Provider Preference (but wants MG only)          Today's Medication Changes          These changes are accurate as of 18  1:17 PM.  If you have any " questions, ask your nurse or doctor.               Start taking these medicines.        Dose/Directions    ranitidine 150 MG tablet   Commonly known as:  ZANTAC   Used for:  Gastroesophageal reflux disease without esophagitis   Started by:  Hakeem Rebolledo MD        Dose:  150 mg   Take 1 tablet (150 mg) by mouth 2 times daily   Quantity:  60 tablet   Refills:  1            Where to get your medicines      These medications were sent to [address] - [address]  [address], [facility] [address]     Phone:  [phone]     omeprazole 40 MG capsule    ranitidine 150 MG tablet                Primary Care Provider Office Phone # Fax #    Patrice Birch [phone]       [address] [facility] [address]        Equal Access to Services     [facility] EDDIE : Hadii rosalie rondon hadasho Soomaali, waaxda luqadaha, qaybta kaalmada adeegyada, devon reynain sheri jauregui . So [facility] [phone].    ATENCIÓN: Si habla español, tiene a hernandez disposición servicios gratuitos de asistencia lingüística. [facility] [phone].    We comply with applicable federal civil rights laws and [place] laws. We do not discriminate on the basis of race, color, national origin, age, disability, sex, sexual orientation, or gender identity.            Thank you!     Thank you for choosing [facility]  for your care. Our goal is always to provide you with excellent care. Hearing back from our patients is one way we can continue to improve our services. Please take a few minutes to complete the written survey that you may receive in the mail after your visit with us. Thank you!             Your Updated Medication List - Protect others around you: Learn how to safely use, store and throw away your medicines at www.disposemymeds.org.          This list is accurate as of 9/12/18  1:17 PM.  Always use your most recent med list.                   Brand Name Dispense  Instructions for use Diagnosis    aspirin 81 MG EC tablet      Take 1 tablet (81 mg) by mouth daily        cyanocobalamin 2500 MCG sublingual tablet    VITAMIN B-12     Place 2,500 mcg under the tongue daily        Glucosamine Chondroit-Collagen Caps           MULTIVITAMINS PO      Take  by mouth.        omeprazole 40 MG capsule    priLOSEC    90 capsule    Take 1 capsule (40 mg) by mouth daily    Gastroesophageal reflux disease without esophagitis       ranitidine 150 MG tablet    ZANTAC    60 tablet    Take 1 tablet (150 mg) by mouth 2 times daily    Gastroesophageal reflux disease without esophagitis

## 2018-09-12 NOTE — PROGRESS NOTES
SUBJECTIVE:                                                      Chief Complaint   Patient presents with     Gastrophageal Reflux     follow up         Patient is here for a follow up on his acid reflux and also needs the medication refilled.     Amount of exercise or physical activity: 2-3 days/week for an average of 30-45 minutes    Problems taking medications regularly: No    Medication side effects: none    Diet: regular (no restrictions)        Blake is a 58 year old comes in for acid reflux recheck.  He had needed increased depression a few weeks ago.  He used both the H2 blocker and his PPI to control his symptoms.  Now he is off the H2 blocker.  He continues long-term use of his PPI.  He is status post bypass surgery.  He has a history of iron deficiency anemia is likely related to his bypass, but has not done endoscopy as of yet.  He did have several serial negative fecal occult blood tests.  Last colonoscopy was in 2010 and clean, that was reviewed.  Unfortunately, he still cannot lay flat due to burning in his chest, thought to be reflux related.    ROS:  Constitutional, HEENT, cardiovascular, pulmonary, gi and gu systems are negative, except as otherwise noted.    OBJECTIVE:                                                    /70 (BP Location: Left arm, Patient Position: Sitting, Cuff Size: Adult Large)  Pulse 79  Temp 97.8  F (36.6  C) (Temporal)  Resp 16  Wt 255 lb (115.7 kg)  SpO2 97%  BMI 35.87 kg/m2  Body mass index is 35.87 kg/(m^2).    GENERAL: healthy, otis  rt and no distress      Diagnostic Test Results:  none      ASSESSMENT/PLAN:                                                        ICD-10-CM    1. Gastric bypass status for obesity Z98.84 GASTROENTEROLOGY ADULT REF PROCEDURE ONLY [facility] [phone]; No Provider Preference (but wants MG only)   2. Gastroesophageal reflux disease without esophagitis K21.9 omeprazole (PRILOSEC) 40 MG capsule     GASTROENTEROLOGY ADULT  REF PROCEDURE ONLY [facility] [phone]; No Provider Preference (but wants MG only)     ranitidine (ZANTAC) 150 MG tablet   3. Morbid obesity (H) E66.01    4. Anemia, unspecified type D64.9        Reflux is uncontrolled.  Add back H2 blocker.  Set him up for an EGD given that it is uncontrolled and his history of Amado-en-Y bypass.  At the same time, due to his iron deficiency anemia would like him scheduled for colonoscopy.  He would like this done at [facility].  Medications ordered as appropriate.  Bariatric surgery referral also placed.  He is due for recheck with them in terms of his current symptoms and long-term management.    Hakeem Rebolledo MD  [facility]

## 2018-09-26 ENCOUNTER — HOSPITAL ENCOUNTER (OUTPATIENT)
Facility: AMBULATORY SURGERY CENTER | Age: 58
End: 2018-09-26
Attending: SURGERY | Admitting: SURGERY
Payer: COMMERCIAL

## 2018-09-27 ENCOUNTER — HOSPITAL ENCOUNTER (OUTPATIENT)
Facility: AMBULATORY SURGERY CENTER | Age: 58
End: 2018-09-27
Attending: SURGERY | Admitting: SURGERY
Payer: COMMERCIAL

## 2018-10-09 ENCOUNTER — HOSPITAL ENCOUNTER (OUTPATIENT)
Facility: AMBULATORY SURGERY CENTER | Age: 58
End: 2018-10-09
Attending: INTERNAL MEDICINE
Payer: COMMERCIAL

## 2019-01-16 ENCOUNTER — ALLIED HEALTH/NURSE VISIT (OUTPATIENT)
Dept: URGENT CARE | Facility: RETAIL CLINIC | Age: 59
End: 2019-01-16
Payer: COMMERCIAL

## 2019-01-16 DIAGNOSIS — Z23 NEED FOR PROPHYLACTIC VACCINATION AND INOCULATION AGAINST INFLUENZA: Primary | ICD-10-CM

## 2019-01-16 PROCEDURE — 90682 RIV4 VACC RECOMBINANT DNA IM: CPT | Performed by: INTERNAL MEDICINE

## 2019-01-16 PROCEDURE — 90471 IMMUNIZATION ADMIN: CPT | Performed by: INTERNAL MEDICINE

## 2019-01-16 PROCEDURE — 99207 ZZC NO CHARGE NURSE ONLY: CPT | Performed by: INTERNAL MEDICINE

## 2019-01-16 NOTE — PROGRESS NOTES
Injectable Influenza Immunization Documentation    1.  Is the person to be vaccinated sick today?   No    2. Does the person to be vaccinated have an allergy to a component   of the vaccine?   No  Egg Allergy Algorithm Link    3. Has the person to be vaccinated ever had a serious reaction   to influenza vaccine in the past?   No    4. Has the person to be vaccinated ever had Guillain-Barré syndrome?   No  Patient instructed to remain in clinic for 20 minutes afterwards, and to report any adverse reaction to me immediately.  Prior to injection verified patient identity using patient's name and date of birth.      Form completed by Donna Lara

## 2019-02-06 ENCOUNTER — APPOINTMENT (OUTPATIENT)
Dept: CT IMAGING | Facility: CLINIC | Age: 59
End: 2019-02-06
Attending: EMERGENCY MEDICINE
Payer: COMMERCIAL

## 2019-02-06 ENCOUNTER — HOSPITAL ENCOUNTER (EMERGENCY)
Facility: CLINIC | Age: 59
Discharge: HOME | End: 2019-02-06
Attending: EMERGENCY MEDICINE | Admitting: EMERGENCY MEDICINE
Payer: COMMERCIAL

## 2019-02-06 VITALS
OXYGEN SATURATION: 95 % | DIASTOLIC BLOOD PRESSURE: 96 MMHG | SYSTOLIC BLOOD PRESSURE: 174 MMHG | RESPIRATION RATE: 18 BRPM | TEMPERATURE: 97.3 F | BODY MASS INDEX: 38.96 KG/M2 | WEIGHT: 277 LBS

## 2019-02-06 DIAGNOSIS — S01.01XA SCALP LACERATION, INITIAL ENCOUNTER: ICD-10-CM

## 2019-02-06 DIAGNOSIS — S06.0X1A CLOSED HEAD INJURY WITH CONCUSSION, WITH LOSS OF CONSCIOUSNESS OF 30 MINUTES OR LESS, INITIAL ENCOUNTER: ICD-10-CM

## 2019-02-06 PROCEDURE — 99284 EMERGENCY DEPT VISIT MOD MDM: CPT | Mod: 25 | Performed by: EMERGENCY MEDICINE

## 2019-02-06 PROCEDURE — 12001 RPR S/N/AX/GEN/TRNK 2.5CM/<: CPT | Mod: Z6 | Performed by: EMERGENCY MEDICINE

## 2019-02-06 PROCEDURE — 12001 RPR S/N/AX/GEN/TRNK 2.5CM/<: CPT | Performed by: EMERGENCY MEDICINE

## 2019-02-06 PROCEDURE — 70450 CT HEAD/BRAIN W/O DYE: CPT

## 2019-02-06 ASSESSMENT — ENCOUNTER SYMPTOMS
FEVER: 0
SHORTNESS OF BREATH: 0
ABDOMINAL PAIN: 0

## 2019-02-06 NOTE — ED AVS SNAPSHOT
[facility] Emergency Department  [address] [place] [address]  Phone:  [phone]  Fax:  [phone]                                    Blake Gibbs   MRN: [id number]    Department:  [facility] Emergency Department   Date of Visit:  2/6/2019           After Visit Summary Signature Page    I have received my discharge instructions, and my questions have been answered. I have discussed any challenges I see with this plan with the nurse or doctor.    ..........................................................................................................................................  Patient/Patient Representative Signature      ..........................................................................................................................................  Patient Representative Print Name and Relationship to Patient    ..................................................               ................................................  Date                                   Time    ..........................................................................................................................................  Reviewed by Signature/Title    ...................................................              ..............................................  Date                                               Time          22EPIC Rev 08/18

## 2019-02-07 NOTE — ED PROVIDER NOTES
History     Chief Complaint   Patient presents with     Head Injury     HPI     Blake Gibbs is a 58 year old male who presents after falling in the parking lot of a local [employer].  He is the had .  He slipped on ice.  Fell backward striking his head.  Individuals witnessed event and thought there was brief loss of conscious for a few seconds.  He then got up and fell a second time.  At that point he started to crawl towards the [employer].  He has full recall of events and he is uncertain if he had actual loss of conscious.  He is complaining of a headache.  Intensity 6/10.  Nausea was brief and is now resolved.  He is having no light or noise sensitivity.  Mild neck strain type discomfort.  Complaining of no other injury concerns resulting from the fall.  Is not on anticoagulant therapy.  Injury occurred just prior to ED arrival.    Significant past medical history for a gastric bypass, lower extremity varicose veins, renal calculi.      Allergies:  Allergies   Allergen Reactions     No Known Drug Allergies        Problem List:    Patient Active Problem List    Diagnosis Date Noted     Obesity (BMI 35.0-39.9) with comorbidity (H) 09/12/2018     Priority: Medium     Anemia-inadequate absorption 09/12/2018     Priority: Medium     CARDIOVASCULAR SCREENING; LDL GOAL LESS THAN 160 10/31/2010     Priority: Medium     Gastric bypass status for obesity 03/18/2010     Priority: Medium     DJD (Degenerative Joint Disease)Bilateral knees 03/18/2010     Priority: Medium     Renal calculus 12/17/2008     Priority: Medium     Obesity 02/03/2003     Priority: Medium     Problem list name updated by automated process. Provider to review       Allergic rhinitis due to pollen 09/12/2002     Priority: Medium     Varicose veins of legs 03/18/2010     Priority: Low     Patient has had one prior vein stripping          Past Medical History:    Past Medical History:   Diagnosis Date     Allergic rhinitis, cause unspecified       Per MTH, try to call  office to have him paged,done, explained situtation to him, advised to have him call ina home, # given. MTH aware. Calculus of ureter 09     Hydronephrosis      Obesity, unspecified        Past Surgical History:    Past Surgical History:   Procedure Laterality Date     C NONSPECIFIC PROCEDURE      Double hernia     C REMV STOMACH,PART,DISTAL,SINCERE-EN-Y       COLONOSCOPY  04/13/10     CYSTOSCOPY,+URETEROSCOPY  09-    [place]     CYSTOSCOPY,DIL URETHRAL STRICTURE  ,     GASTRIC BYPASS       HC LIGATE & DIVISION LONG SAPH VEIN SEP-FEM JUNC, DISTAL INTERUPT  2004     Left leg     HC VASECTOMY UNILAT/BILAT W POSTOP SEMEN      Partial bilateral vasectomy       Family History:    Family History   Problem Relation Age of Onset     Gynecology Mother         Hyst     Alcohol/Drug Other         alcohol and drug     Cancer Paternal Grandfather         prostate     Heart Disease Paternal Uncle         Tacycardia--       Social History:  Marital Status:   [2]  Social History     Tobacco Use     Smoking status: Former Smoker     Years: 24.00     Smokeless tobacco: Never Used     Tobacco comment: quit at 18yrs old   Substance Use Topics     Alcohol use: No     Alcohol/week: 0.0 oz     Drug use: No        Medications:      Cyanocobalamin (VITAMIN  B-12) 2500 MCG tablet   Glucos-Chondroit-Collag-Hyal (GLUCOSAMINE CHONDROIT-COLLAGEN) CAPS   Multiple Vitamin (MULTIVITAMINS PO)   omeprazole (PRILOSEC) 40 MG capsule   ranitidine (ZANTAC) 150 MG tablet         Review of Systems   Constitutional: Negative for fever.   Respiratory: Negative for shortness of breath.    Cardiovascular: Negative for chest pain.   Gastrointestinal: Negative for abdominal pain.   All other systems reviewed and are negative.      Physical Exam   BP: (!) 174/96  Heart Rate: 53  Temp: 97.3  F (36.3  C)  Resp: 18  Weight: 125.6 kg (277 lb)  SpO2: 95 %      Physical Exam   Constitutional: He is oriented to person, place, and time. He appears well-nourished. No distress.   HENT:   Head: Normocephalic.   Right Ear: External ear  normal.   Left Ear: External ear normal.   No noted hemotympanum bilateral.  Patient sustained a scalp contusion with a central laceration in the right superior parietal scalp.  There is no step-off deformity or sponginess to the bone.   Eyes: Conjunctivae and EOM are normal. Pupils are equal, round, and reactive to light.   Neck: Normal range of motion. Neck supple.   C-spine cleared per Nexus criteria.  No distracting injury.   Cardiovascular: Regular rhythm and normal heart sounds.   Pulmonary/Chest: Breath sounds normal. No respiratory distress. He exhibits no tenderness.   Abdominal: There is no tenderness.   Musculoskeletal:        Cervical back: He exhibits no tenderness.        Thoracic back: He exhibits no tenderness.        Lumbar back: He exhibits no tenderness.   Neurological: He is oriented to person, place, and time. He displays normal reflexes. No cranial nerve deficit or sensory deficit. He exhibits normal muscle tone. Coordination normal.   Skin: Skin is warm. Capillary refill takes less than 2 seconds.   Psychiatric: He has a normal mood and affect. His behavior is normal.   Nursing note and vitals reviewed.      ED Course        Procedures                 Results for orders placed or performed during the hospital encounter of 02/06/19 (from the past 24 hour(s))   CT Head w/o Contrast    Narrative    CT OF THE HEAD WITHOUT CONTRAST 2/6/2019 6:50 PM     COMPARISON: None.    HISTORY: Fall with closed head injury-occipital swelling, possible  LOC.    TECHNIQUE: Axial CT images of the head from the skull base to the  vertex were acquired without IV contrast.    FINDINGS: The ventricles and basal cisterns are within normal limits  in configuration. There is no midline shift. There are no extra-axial  fluid collections.  Gray-white differentiation is well maintained.    No intracranial hemorrhage, mass or recent infarct.    The visualized paranasal sinuses are well-aerated. There is no  mastoiditis.  There are no fractures of the visualized bones. There is  a small right parietal scalp hematoma.      Impression    IMPRESSION:  Small right parietal scalp hematoma. Otherwise, normal  head CT.      Radiation dose for this scan was reduced using automated exposure  control, adjustment of the mA and/or kV according to patient size, or  iterative reconstruction technique       Medications - No data to display    Assessments & Plan (with Medical Decision Making)  7:53 PM  58-year-old male presents after closed head injury.  Fell in the parking lot of a local [employer] where he is the head .  He then fell a second time.  Witnesses thought there might have been brief loss of conscious after the first fall.  Struck his right posterior parietal-occipital scalp.  Presents with headache 6/10 intensity and some slight lingering nausea with no other complaints.  States his tetanus is up-to-date.   He was noted to have a scalp contusion with a central laceration measuring 1.5 cm.  It was not gaping and had minimal bleeding.  His neurological examination was normal including cerebellar function testing.  Due to the possibility of LOC associated with this fall/concussion head CT was completed which fortunately showed no intracranial injury.  Special the soft tissue swelling described above.  The laceration was closed with 3 scalp staples.  Had been cleansed and irrigated before closure.  No dressing was necessary.  Postconcussion education was provided.  Instructed to have staples removed in 7-10 days.  Refer to the discharge instructions for more specific details of discharge planning.     I have reviewed the nursing notes.    I have reviewed the findings, diagnosis, plan and need for follow up with the patient.         Medication List      ASK your doctor about these medications    trimethoprim-polymyxin b 39668-3.1 UNIT/ML-% ophthalmic solution  Commonly known as:  POLYTRIM  1 drop, Ophthalmic, EVERY 3 HOURS  Ask about:  Should I take this medication?            Final diagnoses:   Closed head injury with concussion, with loss of consciousness of 30 minutes or less, initial encounter   Scalp laceration, initial encounter       2/6/2019   [facility] EMERGENCY DEPARTMENT     Mario Birch DO  02/06/19 1955

## 2019-02-07 NOTE — DISCHARGE INSTRUCTIONS
Injuries related to fall is consistent with concussion.  Based upon initial response you could have experienced brief loss of conscious.  Imaging:( head CT)shows no internal/intracranial injury.  You do have significant amount of soft tissue scalp swelling(contusion)along with a scalp laceration.  Closure of the scalp laceration was with 3 scalp staples.  These will need to be removed in 7-10 days in the clinic.  You may shower.  Recommend applying ice over the scalp to reduce soft tissue swelling and help reduce discomfort.  Apply for 20 minutes 3-4 times daily.  Would avoid strenuous activity over the next 7 days.  Please be very cautious when outside -avoid falling and incurring a secondary impact event.  Please monitor for development of any new neurologic symptoms.  If noted return to the emergency department for reassessment.  It is common to experience headache, fatigue, slower cognitive processing, light sensitivity, noise sensitivity, nausea after concussion.  These are typical postconcussion symptoms and should resolve in 7 days.  If they persist would recommend clinic follow-up.  Over the next 3-5 days would recommend resting your brain.  Avoid excessive reading, computer work and watching TV.  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  Wake-up protocol for 24 hours as discussed.

## 2019-02-07 NOTE — ED TRIAGE NOTES
Slipped on the ice earlier and hit the back of his head.  Witnesses state that he crawled to the [employer] and the patient does not remember crawling.

## 2019-05-29 ENCOUNTER — OFFICE VISIT (OUTPATIENT)
Dept: FAMILY MEDICINE | Facility: CLINIC | Age: 59
End: 2019-05-29
Payer: COMMERCIAL

## 2019-05-29 VITALS
WEIGHT: 271 LBS | SYSTOLIC BLOOD PRESSURE: 132 MMHG | HEIGHT: 71 IN | BODY MASS INDEX: 37.94 KG/M2 | DIASTOLIC BLOOD PRESSURE: 77 MMHG | OXYGEN SATURATION: 99 % | HEART RATE: 77 BPM | TEMPERATURE: 96.7 F | RESPIRATION RATE: 18 BRPM

## 2019-05-29 DIAGNOSIS — N52.9 ERECTILE DYSFUNCTION, UNSPECIFIED ERECTILE DYSFUNCTION TYPE: ICD-10-CM

## 2019-05-29 DIAGNOSIS — Z00.00 ANNUAL PHYSICAL EXAM: Primary | ICD-10-CM

## 2019-05-29 DIAGNOSIS — Z98.84 GASTRIC BYPASS STATUS FOR OBESITY: ICD-10-CM

## 2019-05-29 DIAGNOSIS — D64.9 ANEMIA, UNSPECIFIED TYPE: ICD-10-CM

## 2019-05-29 LAB
ALBUMIN SERPL-MCNC: 3.6 G/DL (ref 3.4–5)
ALP SERPL-CCNC: 55 U/L (ref 40–150)
ALT SERPL W P-5'-P-CCNC: 35 U/L (ref 0–70)
ANION GAP SERPL CALCULATED.3IONS-SCNC: 4 MMOL/L (ref 3–14)
AST SERPL W P-5'-P-CCNC: 33 U/L (ref 0–45)
BILIRUB SERPL-MCNC: 0.4 MG/DL (ref 0.2–1.3)
BUN SERPL-MCNC: 10 MG/DL (ref 7–30)
CALCIUM SERPL-MCNC: 8.7 MG/DL (ref 8.5–10.1)
CHLORIDE SERPL-SCNC: 109 MMOL/L (ref 94–109)
CHOLEST SERPL-MCNC: 160 MG/DL
CO2 SERPL-SCNC: 27 MMOL/L (ref 20–32)
CREAT SERPL-MCNC: 0.87 MG/DL (ref 0.66–1.25)
DEPRECATED CALCIDIOL+CALCIFEROL SERPL-MC: 37 UG/L (ref 20–75)
ERYTHROCYTE [DISTWIDTH] IN BLOOD BY AUTOMATED COUNT: 15.7 % (ref 10–15)
GFR SERPL CREATININE-BSD FRML MDRD: >90 ML/MIN/{1.73_M2}
GLUCOSE SERPL-MCNC: 84 MG/DL (ref 70–99)
HCT VFR BLD AUTO: 41.3 % (ref 40–53)
HDLC SERPL-MCNC: 55 MG/DL
HGB BLD-MCNC: 13 G/DL (ref 13.3–17.7)
LDLC SERPL CALC-MCNC: 74 MG/DL
MCH RBC QN AUTO: 26.6 PG (ref 26.5–33)
MCHC RBC AUTO-ENTMCNC: 31.5 G/DL (ref 31.5–36.5)
MCV RBC AUTO: 85 FL (ref 78–100)
NONHDLC SERPL-MCNC: 105 MG/DL
PLATELET # BLD AUTO: 273 10E9/L (ref 150–450)
POTASSIUM SERPL-SCNC: 4.5 MMOL/L (ref 3.4–5.3)
PROT SERPL-MCNC: 7.2 G/DL (ref 6.8–8.8)
RBC # BLD AUTO: 4.89 10E12/L (ref 4.4–5.9)
SODIUM SERPL-SCNC: 140 MMOL/L (ref 133–144)
TRIGL SERPL-MCNC: 155 MG/DL
VIT B12 SERPL-MCNC: 1411 PG/ML (ref 193–986)
WBC # BLD AUTO: 5.3 10E9/L (ref 4–11)

## 2019-05-29 PROCEDURE — 80061 LIPID PANEL: CPT | Performed by: FAMILY MEDICINE

## 2019-05-29 PROCEDURE — 80053 COMPREHEN METABOLIC PANEL: CPT | Performed by: FAMILY MEDICINE

## 2019-05-29 PROCEDURE — 36415 COLL VENOUS BLD VENIPUNCTURE: CPT | Performed by: FAMILY MEDICINE

## 2019-05-29 PROCEDURE — 99396 PREV VISIT EST AGE 40-64: CPT | Performed by: FAMILY MEDICINE

## 2019-05-29 PROCEDURE — 85027 COMPLETE CBC AUTOMATED: CPT | Performed by: FAMILY MEDICINE

## 2019-05-29 PROCEDURE — 82607 VITAMIN B-12: CPT | Performed by: FAMILY MEDICINE

## 2019-05-29 PROCEDURE — 82306 VITAMIN D 25 HYDROXY: CPT | Performed by: FAMILY MEDICINE

## 2019-05-29 RX ORDER — SILDENAFIL 25 MG/1
25-50 TABLET, FILM COATED ORAL DAILY PRN
Qty: 5 TABLET | Refills: 3 | Status: SHIPPED | OUTPATIENT
Start: 2019-05-29 | End: 2021-01-15

## 2019-05-29 ASSESSMENT — ENCOUNTER SYMPTOMS
HEADACHES: 0
NAUSEA: 0
COUGH: 0
CONSTIPATION: 0
NERVOUS/ANXIOUS: 0
ARTHRALGIAS: 0
WEAKNESS: 0
HEMATURIA: 0
EYE PAIN: 0
JOINT SWELLING: 0
ABDOMINAL PAIN: 0
PALPITATIONS: 0
DYSURIA: 0
SHORTNESS OF BREATH: 0
MYALGIAS: 0
SORE THROAT: 0
PARESTHESIAS: 0
HEARTBURN: 1
FEVER: 0
CHILLS: 0
HEMATOCHEZIA: 0
FREQUENCY: 0
DIARRHEA: 0

## 2019-05-29 ASSESSMENT — PAIN SCALES - GENERAL: PAINLEVEL: NO PAIN (0)

## 2019-05-29 ASSESSMENT — MIFFLIN-ST. JEOR: SCORE: 2066.61

## 2019-05-29 NOTE — PROGRESS NOTES
SUBJECTIVE:   CC: Blake Gibbs is an 58 year old male who presents for preventative health visit.     Healthy Habits:     Getting at least 3 servings of Calcium per day:  Yes    Bi-annual eye exam:  Yes    Dental care twice a year:  Yes    Sleep apnea or symptoms of sleep apnea:  None    Diet:  Regular (no restrictions)    Frequency of exercise:  4-5 days/week    Duration of exercise:  45-60 minutes    Taking medications regularly:  Yes    Medication side effects:  None    PHQ-2 Total Score: 0    Additional concerns today:  No              Today's PHQ-2 Score:   PHQ-2 ( 1999 Pfizer) 5/29/2019   Q1: Little interest or pleasure in doing things 0   Q2: Feeling down, depressed or hopeless 0   PHQ-2 Score 0   Q1: Little interest or pleasure in doing things Not at all   Q2: Feeling down, depressed or hopeless Not at all   PHQ-2 Score 0       Abuse: Current or Past(Physical, Sexual or Emotional)- No  Do you feel safe in your environment? Yes    Social History     Tobacco Use     Smoking status: Former Smoker     Years: 24.00     Smokeless tobacco: Never Used     Tobacco comment: quit at 18yrs old   Substance Use Topics     Alcohol use: No     Alcohol/week: 0.0 oz     If you drink alcohol do you typically have >3 drinks per day or >7 drinks per week? No    Alcohol Use 5/29/2019   Prescreen: >3 drinks/day or >7 drinks/week? Not Applicable   Prescreen: >3 drinks/day or >7 drinks/week? -   No flowsheet data found.    Last PSA:   PSA   Date Value Ref Range Status   07/22/2009 0.43 0 - 4 ug/L Final       Reviewed orders with patient. Reviewed health maintenance and updated orders accordingly -       Reviewed and updated as needed this visit by clinical staff  Tobacco  Allergies  Meds         Reviewed and updated as needed this visit by Provider            Review of Systems   Constitutional: Negative for chills and fever.   HENT: Negative for congestion, ear pain, hearing loss and sore throat.    Eyes: Negative for pain  "and visual disturbance.   Respiratory: Negative for cough and shortness of breath.    Cardiovascular: Positive for peripheral edema. Negative for chest pain and palpitations.   Gastrointestinal: Positive for heartburn. Negative for abdominal pain, constipation, diarrhea, hematochezia and nausea.   Genitourinary: Positive for impotence. Negative for discharge, dysuria, frequency, genital sores, hematuria and urgency.   Musculoskeletal: Negative for arthralgias, joint swelling and myalgias.   Skin: Negative for rash.   Neurological: Negative for weakness, headaches and paresthesias.   Psychiatric/Behavioral: Negative for mood changes. The patient is not nervous/anxious.          OBJECTIVE:   /77   Pulse 77   Temp 96.7  F (35.9  C) (Temporal)   Resp 18   Ht 1.796 m (5' 10.7\")   Wt 122.9 kg (271 lb)   SpO2 99%   BMI 38.12 kg/m      Physical Exam  GENERAL: healthy, alert and no distress  EYES: Eyes grossly normal to inspection, PERRL and conjunctivae and sclerae normal  HENT: ear canals and TM's normal, nose and mouth without ulcers or lesions  NECK: no adenopathy, no asymmetry, masses, or scars and thyroid normal to palpation  RESP: lungs clear to auscultation - no rales, rhonchi or wheezes  CV: regular rate and rhythm, normal S1 S2, no S3 or S4, no murmur, click or rub, no peripheral edema and peripheral pulses strong  ABDOMEN: soft, nontender, no hepatosplenomegaly, no masses and bowel sounds normal  RECTAL: normal sphincter tone, no rectal masses, prostate normal size, smooth, nontender without nodules or masses  MS: no gross musculoskeletal defects noted, no edema  SKIN: no suspicious lesions or rashes  NEURO: Normal strength and tone, mentation intact and speech normal  PSYCH: mentation appears normal, affect normal/bright    Diagnostic Test Results:  Labs reviewed in Epic    ASSESSMENT/PLAN:       ICD-10-CM    1. Annual physical exam Z00.00 Lipid panel reflex to direct LDL Fasting   2. Anemia, " "unspecified type D64.9 CBC with platelets     Comprehensive metabolic panel   3. Gastric bypass status for obesity Z98.84 Vitamin D Deficiency     Vitamin B12   4. Erectile dysfunction, unspecified erectile dysfunction type N52.9 sildenafil (VIAGRA) 25 MG tablet     Long discussion about PSA, decided against it  Usual exam topics covered  Some temporary issues with the ED due to wife's cancer diagnosis, temporary use of Viagra and see how he does  Due for bypass lab studies that are done routinely  Advised weight loss  Further recommendation to follow  Advise completion of his upper and lower endoscopy due to history of anemia  COUNSELING:   Reviewed preventive health counseling, as reflected in patient instructions    Estimated body mass index is 38.12 kg/m  as calculated from the following:    Height as of this encounter: 1.796 m (5' 10.7\").    Weight as of this encounter: 122.9 kg (271 lb).          reports that he has quit smoking. He quit after 24.00 years of use. He has never used smokeless tobacco.      Counseling Resources:  ATP IV Guidelines  Pooled Cohorts Equation Calculator  FRAX Risk Assessment  ICSI Preventive Guidelines  Dietary Guidelines for Americans, 2010  USDA's MyPlate  ASA Prophylaxis  Lung CA Screening    Hakeem Rebolledo MD  [facility]  "

## 2019-08-09 ENCOUNTER — TELEPHONE (OUTPATIENT)
Dept: EMERGENCY MEDICINE | Facility: CLINIC | Age: 59
End: 2019-08-09

## 2019-08-09 NOTE — TELEPHONE ENCOUNTER
Reason for Call:  Form, our goal is to have forms completed with 72 hours, however, some forms may require a visit or additional information.    Type of letter, form or note:  retirment     Who is the form from?: intermediate  (if other please explain)    Where did the form come from: Patient or family brought in       What clinic location was the form placed at?: [facility]    Where the form was placed:  box Box/Folder    What number is listed as a contact on the form?: cell        Additional comments:     Call taken on 8/9/2019 at 10:42 AM by Dee Birch

## 2019-09-28 ENCOUNTER — HEALTH MAINTENANCE LETTER (OUTPATIENT)
Age: 59
End: 2019-09-28

## 2020-06-22 ENCOUNTER — TELEPHONE (OUTPATIENT)
Dept: FAMILY MEDICINE | Facility: CLINIC | Age: 60
End: 2020-06-22

## 2020-06-22 DIAGNOSIS — K92.1 BLOOD IN STOOL: ICD-10-CM

## 2020-06-22 DIAGNOSIS — D64.9 ANEMIA, UNSPECIFIED TYPE: Primary | ICD-10-CM

## 2020-06-22 DIAGNOSIS — Z98.84 GASTRIC BYPASS STATUS FOR OBESITY: ICD-10-CM

## 2020-06-22 NOTE — TELEPHONE ENCOUNTER
Please review orders and DX are correct and sign.  They will call the patient with dates and time of procedure.   Lisset SCHAFFER

## 2020-06-22 NOTE — TELEPHONE ENCOUNTER
He has noted blood in his stool and he has a history of iron deficiency anemia and gastric bypass. Needs a CBC, CMP, iron, b12, folate, vit D, and upper and lower scopes set up with elvira Lackey

## 2020-06-26 ENCOUNTER — TELEPHONE (OUTPATIENT)
Dept: FAMILY MEDICINE | Facility: CLINIC | Age: 60
End: 2020-06-26

## 2020-06-26 NOTE — TELEPHONE ENCOUNTER
Date of colonoscopy/EGD: 7/2  Surgeon: Dr. Lane  Prep:Miralax  Packet:Colonoscopy/EGD instructions mailed to patient's home address.   Date: 6/26/2020      Surgery Scheduler

## 2020-06-26 NOTE — LETTER

## 2020-06-29 DIAGNOSIS — Z11.59 ENCOUNTER FOR SCREENING FOR OTHER VIRAL DISEASES: Primary | ICD-10-CM

## 2020-06-30 DIAGNOSIS — Z11.59 ENCOUNTER FOR SCREENING FOR OTHER VIRAL DISEASES: ICD-10-CM

## 2020-06-30 LAB
SARS-COV-2 RNA SPEC QL NAA+PROBE: NOT DETECTED
SPECIMEN SOURCE: NORMAL

## 2020-06-30 PROCEDURE — U0003 INFECTIOUS AGENT DETECTION BY NUCLEIC ACID (DNA OR RNA); SEVERE ACUTE RESPIRATORY SYNDROME CORONAVIRUS 2 (SARS-COV-2) (CORONAVIRUS DISEASE [COVID-19]), AMPLIFIED PROBE TECHNIQUE, MAKING USE OF HIGH THROUGHPUT TECHNOLOGIES AS DESCRIBED BY CMS-2020-01-R: HCPCS | Performed by: SURGERY

## 2020-07-01 ENCOUNTER — TELEPHONE (OUTPATIENT)
Dept: NURSING | Facility: CLINIC | Age: 60
End: 2020-07-01

## 2020-07-01 NOTE — TELEPHONE ENCOUNTER
Hand-off from Ines TALAVERA, re: COVID test result prior to scheduled procedure tomorrow 7/2/2020.    No flu-like symptoms. No fever. No cough, no SOB.    Coronavirus (COVID-19) Notification    Lab Result   Lab test 2019-nCoV rRt-PCR OR SARS-COV-2 PCR    Nasopharyngeal AND/OR Oropharyngeal swab is NEGATIVE for 2019-nCoV RNA [OR] SARS-COV-2 RNA (COVID-19) RNA    Your result was negative. This means that we didn't find the virus that causes COVID-19 in your sample. A test may show negative when you do actually have the virus. This can happen when the virus is in the early stages of infection, before you feel illness symptoms.    If you have symptoms   Stay home and away from others (self-isolate) until you meet ALL of the guidelines below:    You've had no fever--and no medicine that reduces fever--for 3 full days (72 hours). And      Your other symptoms have gotten better. For example, your cough or breathing has improved. And     At least 10 days have passed since your symptoms started.    During this time:    Stay home. Don't go to work, school or anywhere else.     Stay in your own room, including for meals. Use your own bathroom if you can.    Stay away from others in your home. No hugging, kissing or shaking hands. No visitors.    Clean  high touch  surfaces often (doorknobs, counters, handles, etc.). Use a household cleaning spray or wipes. You can find a full list on the EPA website at www.epa.gov/pesticide-registration/list-n-disinfectants-use-against-sars-cov-2.    Cover your mouth and nose with a mask, tissue or washcloth to avoid spreading germs.    Wash your hands and face often with soap and water.    Going back to work  Check with your employer for any guidelines to follow for going back to work.  You are sent a letter for your Employer which will serve as formal document notice that you, the employee, tested negative for COVID-19, as of the testing date shown above.    If your symptoms worsen or other  concerning symptoms, contact PCP, oncare or consider returning to Emergency Dept.    Where can I get more information?    [facility]: [email or web address]    Coronavirus Basics: www.health.[email or web address].[place]/diseases/coronavirus/basics.html    [facility] Hotline ([phone])    {Name]  Reena Rosen RN/[place] Nurse Advisor

## 2020-07-02 ENCOUNTER — ANESTHESIA EVENT (OUTPATIENT)
Dept: GASTROENTEROLOGY | Facility: CLINIC | Age: 60
End: 2020-07-02
Payer: COMMERCIAL

## 2020-07-02 ENCOUNTER — HOSPITAL ENCOUNTER (OUTPATIENT)
Facility: CLINIC | Age: 60
Discharge: HOME | End: 2020-07-02
Attending: SURGERY | Admitting: SURGERY
Payer: COMMERCIAL

## 2020-07-02 ENCOUNTER — ANESTHESIA (OUTPATIENT)
Dept: GASTROENTEROLOGY | Facility: CLINIC | Age: 60
End: 2020-07-02
Payer: COMMERCIAL

## 2020-07-02 VITALS
HEART RATE: 61 BPM | HEIGHT: 71 IN | OXYGEN SATURATION: 95 % | DIASTOLIC BLOOD PRESSURE: 70 MMHG | RESPIRATION RATE: 16 BRPM | SYSTOLIC BLOOD PRESSURE: 113 MMHG | BODY MASS INDEX: 37.8 KG/M2 | TEMPERATURE: 97.8 F

## 2020-07-02 LAB
COLONOSCOPY: NORMAL
UPPER GI ENDOSCOPY: NORMAL

## 2020-07-02 PROCEDURE — 37000009 ZZH ANESTHESIA TECHNICAL FEE, EACH ADDTL 15 MIN: Performed by: SURGERY

## 2020-07-02 PROCEDURE — 88305 TISSUE EXAM BY PATHOLOGIST: CPT | Mod: 26 | Performed by: SURGERY

## 2020-07-02 PROCEDURE — G0121 COLON CA SCRN NOT HI RSK IND: HCPCS | Performed by: SURGERY

## 2020-07-02 PROCEDURE — 25000125 ZZHC RX 250: Performed by: SURGERY

## 2020-07-02 PROCEDURE — 45378 DIAGNOSTIC COLONOSCOPY: CPT | Performed by: SURGERY

## 2020-07-02 PROCEDURE — 25000125 ZZHC RX 250: Performed by: NURSE ANESTHETIST, CERTIFIED REGISTERED

## 2020-07-02 PROCEDURE — 43239 EGD BIOPSY SINGLE/MULTIPLE: CPT | Mod: 51 | Performed by: SURGERY

## 2020-07-02 PROCEDURE — 25800030 ZZH RX IP 258 OP 636: Performed by: NURSE ANESTHETIST, CERTIFIED REGISTERED

## 2020-07-02 PROCEDURE — 43239 EGD BIOPSY SINGLE/MULTIPLE: CPT | Performed by: SURGERY

## 2020-07-02 PROCEDURE — 25000128 H RX IP 250 OP 636: Performed by: NURSE ANESTHETIST, CERTIFIED REGISTERED

## 2020-07-02 PROCEDURE — 37000008 ZZH ANESTHESIA TECHNICAL FEE, 1ST 30 MIN: Performed by: SURGERY

## 2020-07-02 PROCEDURE — 88305 TISSUE EXAM BY PATHOLOGIST: CPT | Performed by: SURGERY

## 2020-07-02 RX ORDER — PROPOFOL 10 MG/ML
INJECTION, EMULSION INTRAVENOUS PRN
Status: DISCONTINUED | OUTPATIENT
Start: 2020-07-02 | End: 2020-07-02

## 2020-07-02 RX ORDER — LIDOCAINE 40 MG/G
CREAM TOPICAL
Status: DISCONTINUED | OUTPATIENT
Start: 2020-07-02 | End: 2020-07-02 | Stop reason: HOSPADM

## 2020-07-02 RX ORDER — ONDANSETRON 4 MG/1
4 TABLET, ORALLY DISINTEGRATING ORAL EVERY 6 HOURS PRN
Status: DISCONTINUED | OUTPATIENT
Start: 2020-07-02 | End: 2020-07-02 | Stop reason: HOSPADM

## 2020-07-02 RX ORDER — SODIUM CHLORIDE, SODIUM LACTATE, POTASSIUM CHLORIDE, CALCIUM CHLORIDE 600; 310; 30; 20 MG/100ML; MG/100ML; MG/100ML; MG/100ML
INJECTION, SOLUTION INTRAVENOUS CONTINUOUS
Status: DISCONTINUED | OUTPATIENT
Start: 2020-07-02 | End: 2020-07-02 | Stop reason: HOSPADM

## 2020-07-02 RX ORDER — ONDANSETRON 2 MG/ML
4 INJECTION INTRAMUSCULAR; INTRAVENOUS
Status: DISCONTINUED | OUTPATIENT
Start: 2020-07-02 | End: 2020-07-02 | Stop reason: HOSPADM

## 2020-07-02 RX ORDER — ONDANSETRON 2 MG/ML
4 INJECTION INTRAMUSCULAR; INTRAVENOUS EVERY 30 MIN PRN
Status: DISCONTINUED | OUTPATIENT
Start: 2020-07-02 | End: 2020-07-02 | Stop reason: HOSPADM

## 2020-07-02 RX ORDER — ONDANSETRON 4 MG/1
4 TABLET, ORALLY DISINTEGRATING ORAL EVERY 30 MIN PRN
Status: DISCONTINUED | OUTPATIENT
Start: 2020-07-02 | End: 2020-07-02 | Stop reason: HOSPADM

## 2020-07-02 RX ORDER — LIDOCAINE HYDROCHLORIDE 20 MG/ML
INJECTION, SOLUTION INFILTRATION; PERINEURAL PRN
Status: DISCONTINUED | OUTPATIENT
Start: 2020-07-02 | End: 2020-07-02

## 2020-07-02 RX ORDER — NALOXONE HYDROCHLORIDE 0.4 MG/ML
.1-.4 INJECTION, SOLUTION INTRAMUSCULAR; INTRAVENOUS; SUBCUTANEOUS
Status: DISCONTINUED | OUTPATIENT
Start: 2020-07-02 | End: 2020-07-02 | Stop reason: HOSPADM

## 2020-07-02 RX ORDER — ALBUTEROL SULFATE 0.83 MG/ML
2.5 SOLUTION RESPIRATORY (INHALATION) EVERY 4 HOURS PRN
Status: DISCONTINUED | OUTPATIENT
Start: 2020-07-02 | End: 2020-07-02 | Stop reason: HOSPADM

## 2020-07-02 RX ORDER — FLUMAZENIL 0.1 MG/ML
0.2 INJECTION, SOLUTION INTRAVENOUS
Status: DISCONTINUED | OUTPATIENT
Start: 2020-07-02 | End: 2020-07-02 | Stop reason: HOSPADM

## 2020-07-02 RX ORDER — ONDANSETRON 2 MG/ML
4 INJECTION INTRAMUSCULAR; INTRAVENOUS EVERY 6 HOURS PRN
Status: DISCONTINUED | OUTPATIENT
Start: 2020-07-02 | End: 2020-07-02 | Stop reason: HOSPADM

## 2020-07-02 RX ORDER — PROPOFOL 10 MG/ML
INJECTION, EMULSION INTRAVENOUS CONTINUOUS PRN
Status: DISCONTINUED | OUTPATIENT
Start: 2020-07-02 | End: 2020-07-02

## 2020-07-02 RX ADMIN — LIDOCAINE HYDROCHLORIDE 5 ML: 10 INJECTION, SOLUTION EPIDURAL; INFILTRATION; INTRACAUDAL; PERINEURAL at 09:40

## 2020-07-02 RX ADMIN — SODIUM CHLORIDE, POTASSIUM CHLORIDE, SODIUM LACTATE AND CALCIUM CHLORIDE: 600; 310; 30; 20 INJECTION, SOLUTION INTRAVENOUS at 09:40

## 2020-07-02 RX ADMIN — LIDOCAINE HYDROCHLORIDE 60 MG: 20 INJECTION, SOLUTION INFILTRATION; PERINEURAL at 10:01

## 2020-07-02 RX ADMIN — PROPOFOL 200 MCG/KG/MIN: 10 INJECTION, EMULSION INTRAVENOUS at 10:01

## 2020-07-02 RX ADMIN — PROPOFOL 70 MG: 10 INJECTION, EMULSION INTRAVENOUS at 10:01

## 2020-07-02 ASSESSMENT — LIFESTYLE VARIABLES: TOBACCO_USE: 1

## 2020-07-02 NOTE — ANESTHESIA PREPROCEDURE EVALUATION
Anesthesia Pre-Procedure Evaluation    Patient: Hoda Gibbs   MRN: [id number] : 1960          Preoperative Diagnosis: Anemia, unspecified type [D64.9]    Procedure(s):  Esophagogastroduodenoscopy with possible biopsy, polypectomy, dilation, and/or foreign body removal  COLONOSCOPY    Past Medical History:   Diagnosis Date     Allergic rhinitis, cause unspecified     Allergic rhinitis     Calculus of ureter 09    discharged 09     Hydronephrosis      Obesity, unspecified     Obesity     Past Surgical History:   Procedure Laterality Date     C NONSPECIFIC PROCEDURE      Double hernia     C REMV STOMACH,PART,DISTAL,SINCERE-EN-Y       COLONOSCOPY  04/13/10     CYSTOSCOPY,+URETEROSCOPY  09-    [place]     CYSTOSCOPY,DIL URETHRAL STRICTURE  ,     GASTRIC BYPASS       HC LIGATE & DIVISION LONG SAPH VEIN SEP-FEM JUNC, DISTAL INTERUPT  2004     Left leg     HC VASECTOMY UNILAT/BILAT W POSTOP SEMEN      Partial bilateral vasectomy       Anesthesia Evaluation     . Pt has had prior anesthetic.     No history of anesthetic complications          ROS/MED HX    ENT/Pulmonary:     (+)KRYS risk factors snores loudly, tobacco use, Past use , . .    Neurologic:  - neg neurologic ROS     Cardiovascular:     (+) ----. : . . . :. . Previous cardiac testing Echodate:[id number]:Patric Harper MD  [phone]  2015      Narrative & Impression     Interpretation Summary                    [facility]  Echocardiography Laboratory  [address] [place] [address]        Name: HODA GIBBS  MRN: [id number]  : 1960  Study Date: 2015 10:10 AM  Age: 55 yrs  Gender: Male  Patient Location: [facility]  Reason For Study: Chest Pressure  Ordering Physician: ANDREW PATEL  Referring Physician: Patrice Birch MD  Performed By: Jorge Ramirez     BSA: 2.3 m2  Height: 71 in  Weight: 255 lb  HR: 87  BP: 128/74  "mmHg  ______________________________________________________________________________        Procedure  Stress Echo Complete. Contrast Definity.  ______________________________________________________________________________     Interpretation Summary  Normal resting wall motion and no stress-induced wall motion abnormality.  Exercise and EKG portion reported separately. THIS IS A NORMAL STRESS  ECHOCARDIOGRAM.  ______________________________________________________________________________     Stress  The patient exercised 8'45\".  A moderate workload was achieved.  Target Heart Rate was achieved.  Exercise and EKG portion reported separately.  Left ventricular cavity size decreases with exercise.  Global LV systolic function augments with exercise.  The visual ejection fraction is estimated at >70%.  Normal resting wall motion and no stress-induced wall motion abnormality.  This was a normal stress echocardiogram.     Baseline  The visual ejection fraction is estimated at 55-60%.  Normal left ventricular wall motion.  No hemodynamically significant valvular abnormalities on 2D or color flow  imaging.     Stress Results   Protocol: [place] [facility]         Maximum Predicted HR:  165 bpm        Target HR:     140 bpm% Max           imum Predicted HR:   94 %                   +---------+--------+----------+------+---------+                :  Stage  :Duration:Heart Rate:  BPCom    ment :                :         : (mm:ss):   (bpm)  :      :         :                +---------+--------+----------+------+---------+                : Stage 1 :  3:00 10      5   :158/68:         :                +---------+--------+----------+------+---------+                : Stage 2 :  3:00 13      1   :164/62:         :                +---------+--------+----------+------+---------+                : Stage 3 :  2:45 15      5   :170/62:[id number]:                +---------+--------+----------+------+---------+                :RECOVERY :  6:30 " 93          :126/66:         :                +---------+--------+----------+------+---------+             Stress Duration:  8:45 mm:ss        Recovery Time: 6:30 mm:ss         Maximum Stress HR:   155 bpmM              ETS:         10     ______________________________________________________________________________           Doppler Measurements & Calculations  TR max bryant: 290.0 cm/sec  TR max P.6 mmHg           ______________________________________________________________________________        Report approved by: Anival Renee 2015 03:13 PM           Specimen Collected: 11/04/15 10:10  Last Resulted: 11/04/15 15:13       date: results:ECG reviewed date:11-4-15 results:Sinus  Bradycardia   WITHIN NORMAL LIMITS date: results:          METS/Exercise Tolerance:     Hematologic:  - neg hematologic  ROS       Musculoskeletal:  - neg musculoskeletal ROS       GI/Hepatic: Comment:  had gastric bypass    (+) GERD Symptomatic,       Renal/Genitourinary:     (+) chronic renal disease,       Endo:     (+) Obesity, .      Psychiatric:  - neg psychiatric ROS       Infectious Disease:  - neg infectious disease ROS       Malignancy:      - no malignancy   Other:    - neg other ROS                      Physical Exam  Normal systems: cardiovascular and pulmonary    Airway   Mallampati: II  TM distance: >3 FB  Neck ROM: full    Dental   (+) upper dentures    Cardiovascular   Rhythm and rate: regular and normal      Pulmonary    breath sounds clear to auscultation            Lab Results   Component Value Date    WBC 5.3 2019    HGB 13.0 (L) 2019    HCT 41.3 2019     2019    CRP <2.9 2018     2019    POTASSIUM 4.5 2019    CHLORIDE 109 2019    CO2 27 2019    BUN 10 2019    CR 0.87 2019    GLC 84 2019    PETE 8.7 2019    ALBUMIN 3.6 2019    PROTTOTAL 7.2 2019    ALT 35 2019    AST 33 2019    ALKPHOS 55  "05/29/2019    BILITOTAL 0.4 05/29/2019    LIPASE 211 11/04/2015    TSH 1.72 08/24/2012       Preop Vitals  BP Readings from Last 3 Encounters:   07/02/20 (!) 144/75   05/29/19 132/77   02/06/19 (!) 174/96    Pulse Readings from Last 3 Encounters:   05/29/19 77   09/12/18 79   02/12/18 86      Resp Readings from Last 3 Encounters:   07/02/20 16   05/29/19 18   02/06/19 18    SpO2 Readings from Last 3 Encounters:   07/02/20 95%   05/29/19 99%   02/06/19 95%      Temp Readings from Last 1 Encounters:   07/02/20 97.8  F (36.6  C) (Oral)    Ht Readings from Last 1 Encounters:   07/02/20 1.803 m (5' 11\")      Wt Readings from Last 1 Encounters:   05/29/19 122.9 kg (271 lb)    Estimated body mass index is 37.8 kg/m  as calculated from the following:    Height as of this encounter: 1.803 m (5' 11\").    Weight as of 5/29/19: 122.9 kg (271 lb).       Anesthesia Plan      History & Physical Review  History and physical reviewed and following examination; no interval change.    ASA Status:  2 .    NPO Status:  > 8 hours    Plan for MAC with Intravenous and Propofol induction. Maintenance will be TIVA.  Reason for MAC:  Deep or markedly invasive procedure (G8)    Dr Lackey performed the H&P pre-op        Postoperative Care      Consents  Anesthetic plan, risks, benefits and alternatives discussed with:  Patient.  Use of blood products discussed: No .   .                 YAMILETH Dimas CRNA  "

## 2020-07-02 NOTE — DISCHARGE INSTRUCTIONS
[facility]    Home Care Following Endoscopy          Activity:    You have just undergone an endoscopic procedure usually performed with conscious sedation.  Do not work or operate machinery (including a car) for at least 12 hours.      I encourage you to walk and attempt to pass this air as soon as possible.    Diet:    Return to the diet you were on before your procedure but eat lightly for the first 12-24 hours.    Drink plenty of water.    Resume any regular medications unless otherwise advised by your physician.  Please begin any new medication prescribed as a result of your procedure as directed by your physician.     If you had any biopsy or polyp removed please refrain from aspirin or aspirin products for 2 days.  If on Coumadin please restart as instructed by your physician.   Pain:    You may take Tylenol as needed for pain.  Expected Recovery:    You can expect some mild abdominal fullness and/or discomfort due to the air used to inflate your intestinal tract. It is also normal to have a mild sore throat after upper endoscopy.    Call Your Physician if You Have:    After Upper Endoscopy:  o Shoulder, back or chest pain.  o Difficulty breathing or swallowing.  o Vomiting blood.    After Colonoscopy:  o Worsening persisting abdominal pain which is worse with activity.  o Fevers (>101 degrees F), chills or shakes.  o Passage of continued blood with bowel movements.   Any questions or concerns about your recovery, please call [phone] or after hours [phone] Nurse Advice Line.    Follow-up Care:    You should receive a call or letter with your results within 1 week. Please call if you have not received a notification of your results.  If asked to return to clinic please make an appointment 1 week after your procedure.  Call [phone].

## 2020-07-02 NOTE — H&P
Patient seen for Endoscopy    HPI:  Patient is a 60 year old male with recent anemia, history of RYGB and need for screening colonoscopy. Not taking blood thinning medications. No MI or CVA history. No issues with previous sedation. No recent acute illness.    Review Of Systems    Skin: negative  Ears/Nose/Throat: negative  Respiratory: No shortness of breath, dyspnea on exertion, cough, or hemoptysis  Cardiovascular: negative  Gastrointestinal: negative  Genitourinary: negative  Musculoskeletal: negative  Neurologic: negative  Hematologic/Lymphatic/Immunologic: negative  Endocrine: negative      Past Medical History:   Diagnosis Date     Allergic rhinitis, cause unspecified     Allergic rhinitis     Calculus of ureter 09    discharged 09     Hydronephrosis      Obesity, unspecified     Obesity       Past Surgical History:   Procedure Laterality Date     C NONSPECIFIC PROCEDURE      Double hernia     C REMV STOMACH,PART,DISTAL,SINCERE-EN-Y       COLONOSCOPY  04/13/10     CYSTOSCOPY,+URETEROSCOPY  09-    [place]     CYSTOSCOPY,DIL URETHRAL STRICTURE  ,     GASTRIC BYPASS       HC LIGATE & DIVISION LONG SAPH VEIN SEP-FEM JUNC, DISTAL INTERUPT  2004     Left leg     HC VASECTOMY UNILAT/BILAT W POSTOP SEMEN      Partial bilateral vasectomy       Family History   Problem Relation Age of Onset     Gynecology Mother         Hyst     Alcohol/Drug Other         alcohol and drug     Cancer Paternal Grandfather         prostate     Heart Disease Paternal Uncle         Tacycardia--       Social History     Socioeconomic History     Marital status:      Spouse name: Alejandra     Number of children: 4     Years of education: Not on file     Highest education level: Not on file   Occupational History     Occupation:    Social Needs     Financial resource strain: Not on file     Food insecurity     Worry: Not on file     Inability: Not on file     Transportation needs      "Medical: Not on file     Non-medical: Not on file   Tobacco Use     Smoking status: Former Smoker     Years: 24.00     Smokeless tobacco: Never Used     Tobacco comment: quit at 18yrs old   Substance and Sexual Activity     Alcohol use: No     Alcohol/week: 0.0 standard drinks     Drug use: No     Sexual activity: Yes     Partners: Female     Birth control/protection: Surgical   Lifestyle     Physical activity     Days per week: Not on file     Minutes per session: Not on file     Stress: Not on file   Relationships     Social connections     Talks on phone: Not on file     Gets together: Not on file     Attends Sikh service: Not on file     Active member of club or organization: Not on file     Attends meetings of clubs or organizations: Not on file     Relationship status: Not on file     Intimate partner violence     Fear of current or ex partner: Not on file     Emotionally abused: Not on file     Physically abused: Not on file     Forced sexual activity: Not on file   Other Topics Concern      Service No     Blood Transfusions No     Caffeine Concern No     Occupational Exposure Not Asked     Hobby Hazards Not Asked     Sleep Concern No     Stress Concern Yes     Weight Concern No     Special Diet Not Asked     Back Care Not Asked     Exercise Yes     Bike Helmet Not Asked     Seat Belt Yes     Self-Exams Not Asked     Parent/sibling w/ CABG, MI or angioplasty before 65F 55M? Not Asked   Social History Narrative     Not on file       No current outpatient medications on file.       Medications and history reviewed    Physical exam:  Vitals: BP (!) 144/75   Temp 97.8  F (36.6  C) (Oral)   Resp 16   Ht 1.803 m (5' 11\")   SpO2 95%   BMI 37.80 kg/m    BMI= Body mass index is 37.8 kg/m .    Constitutional: Healthy, alert, non-distressed   Head: Normo-cephalic, atraumatic, no lesions, masses or tenderness   Cardiovascular: RRR, no new murmurs, +S1, +S2 heart sounds, no clicks, rubs or gallops "   Respiratory: CTAB, no rales, rhonchi or wheezing, equal chest rise, good respiratory effort   Gastrointestinal: Soft, non-tender, non distended, no rebound rigidity or guarding, no masses or hernias palpated   : Deferred  Musculoskeletal: Moves all extremities, normal  strength, no deformities noted   Skin: No suspicious lesions or rashes   Psychiatric: Mentation appears normal, affect appropriate   Hematologic/Lymphatic/Immunologic: Normal cervical and supraclavicular lymph nodes   Patient able to get up on table without difficulty.    Labs show:  No results found for this or any previous visit (from the past 24 hour(s)).    Assessment: Endoscopy  Plan: Pt cleared for anesthesia for proposed procedure.    Gio Lackey, DO

## 2020-07-02 NOTE — LETTER
Blake Gibbs  [id number] [facility] [id number]    July 10, 2020      Dear Blake,  This letter is to inform you of the results of your pathology report from your endoscopy.  Your pathology report was:  FINAL DIAGNOSIS:   GE Junction, biopsy-   -Stratified squamous mucosa without significant pathologic alterations   -Glandular cardia type mucosa with mild nonspecific chronic inflammation   -Negative for Farr's metaplasia, dysplasia or malignancy  These are normal findings. No Farr's or other abnormalities found.  If you have further questions please don t hesitate to call our clinic at [phone].   Sincerely,     Gio Lackey, DO

## 2020-07-02 NOTE — ANESTHESIA POSTPROCEDURE EVALUATION
Patient: Blake Gibbs    Procedure(s):  Esophagogastroduodenoscopy with biopsy,  COLONOSCOPY    Diagnosis:Anemia, unspecified type [D64.9]  Diagnosis Additional Information: No value filed.    Anesthesia Type:  MAC    Note:  Anesthesia Post Evaluation    Patient location during evaluation: Phase 2  Patient participation: Able to fully participate in evaluation  Level of consciousness: awake  Pain management: adequate  Airway patency: patent  Cardiovascular status: acceptable and hemodynamically stable  Respiratory status: acceptable, room air and nonlabored ventilation  Hydration status: stable  PONV: none     Anesthetic complications: None    Comments: Patient was happy with the anesthesia care received and no anesthesia related complications were noted.  I will follow up with the patient again if it is needed.        Last vitals:  Vitals:    07/02/20 0921 07/02/20 1030 07/02/20 1040   BP: (!) 144/75 110/72 116/75   Pulse:   58   Resp: 16     Temp: 97.8  F (36.6  C)     SpO2: 95%  95%         Electronically Signed By: YAMILETH Dimas CRNA  July 2, 2020  10:49 AM

## 2020-07-02 NOTE — ANESTHESIA CARE TRANSFER NOTE
Patient: Blake Gibbs    Procedure(s):  Esophagogastroduodenoscopy with biopsy,  COLONOSCOPY    Diagnosis: Anemia, unspecified type [D64.9]  Diagnosis Additional Information: No value filed.    Anesthesia Type:   MAC     Note:  Airway :Nasal Cannula  Patient transferred to:Phase II  Handoff Report: Identifed the Patient, Identified the Reponsible Provider, Reviewed the pertinent medical history, Discussed the surgical course, Reviewed Intra-OP anesthesia mangement and issues during anesthesia, Set expectations for post-procedure period and Allowed opportunity for questions and acknowledgement of understanding      Vitals: (Last set prior to Anesthesia Care Transfer)    CRNA VITALS  7/2/2020 1000 - 7/2/2020 1048      7/2/2020             EKG:  Sinus rhythm                Electronically Signed By: YAMILETH Dimas CRNA  July 2, 2020  10:48 AM

## 2020-07-06 LAB — COPATH REPORT: NORMAL

## 2020-07-13 ENCOUNTER — TELEPHONE (OUTPATIENT)
Dept: FAMILY MEDICINE | Facility: CLINIC | Age: 60
End: 2020-07-13

## 2020-07-13 DIAGNOSIS — K21.9 GASTROESOPHAGEAL REFLUX DISEASE WITHOUT ESOPHAGITIS: Primary | ICD-10-CM

## 2020-10-30 ENCOUNTER — TELEPHONE (OUTPATIENT)
Dept: FAMILY MEDICINE | Facility: CLINIC | Age: 60
End: 2020-10-30

## 2020-10-30 ENCOUNTER — ALLIED HEALTH/NURSE VISIT (OUTPATIENT)
Dept: FAMILY MEDICINE | Facility: CLINIC | Age: 60
End: 2020-10-30
Payer: COMMERCIAL

## 2020-10-30 DIAGNOSIS — R05.9 COUGH: Primary | ICD-10-CM

## 2020-10-30 DIAGNOSIS — R05.9 COUGH: ICD-10-CM

## 2020-10-30 PROCEDURE — U0003 INFECTIOUS AGENT DETECTION BY NUCLEIC ACID (DNA OR RNA); SEVERE ACUTE RESPIRATORY SYNDROME CORONAVIRUS 2 (SARS-COV-2) (CORONAVIRUS DISEASE [COVID-19]), AMPLIFIED PROBE TECHNIQUE, MAKING USE OF HIGH THROUGHPUT TECHNOLOGIES AS DESCRIBED BY CMS-2020-01-R: HCPCS | Performed by: FAMILY MEDICINE

## 2020-10-30 PROCEDURE — 99207 PR NO CHARGE NURSE ONLY: CPT

## 2020-10-30 NOTE — PROGRESS NOTES
Patient was verified by 2 identifiers, swabbed, given information sheet, and swab was walked down to lab.    Yuko Villar CMA ([facility]) 10/30/2020

## 2020-10-31 ENCOUNTER — NURSE TRIAGE (OUTPATIENT)
Dept: NURSING | Facility: CLINIC | Age: 60
End: 2020-10-31

## 2020-10-31 LAB
SARS-COV-2 RNA SPEC QL NAA+PROBE: ABNORMAL
SPECIMEN SOURCE: ABNORMAL

## 2020-10-31 NOTE — TELEPHONE ENCOUNTER
Coronavirus (COVID-19) Notification     Reason for call  Patient requesting results     Lab Result    Lab test 2019-nCoV rRt-PCR in process        RN Recommendations/Instructions per [facility]  Continue quarantee and following instructions until you receive the results     Please Contact your PCP clinic or return to the Emergency department if your:    Symptoms worsen or other concerning symptom's.     Patient informed that if test for COVID19 is POSITIVE,  you will receive a call typically within 48 hours from the test date (date lab collected).  If NEGATIVE result, you will receive a letter in the mail or [employer].      [RN/LPN Name]  Bernadette Dooley RN  Triage Nurse Advisor    Reason for Disposition    Caller requesting lab results    Protocols used: PCP CALL - NO TRIAGE-A-

## 2020-11-01 NOTE — TELEPHONE ENCOUNTER
"Coronavirus (COVID-19) Notification    Caller Name (Patient, parent, daughter/son, grandparent, etc)  Patient- Blake Gibbs    Reason for call  Notify of Positive Coronavirus (COVID-19) lab results, assess symptoms,  review  [employer] recommendations    Lab Result    Lab test:  2019-nCoV rRt-PCR or SARS-CoV-2 PCR    Oropharyngeal AND/OR nasopharyngeal swabs is POSITIVE for 2019-nCoV RNA/SARS-COV-2 PCR (COVID-19 virus)    RN Recommendations/Instructions per [facility] Coronavirus COVID-19 recommendations    Brief introduction script  Introduce self then review script:  \"I am calling on behalf of [employer].  We were notified that your Coronavirus test (COVID-19) for was POSITIVE for the virus.  I have some information to relay to you but first I wanted to mention that the [place] Dept of Health will be contacting you shortly [it's possible MD already called Patient] to talk to you more about how you are feeling and other people you have had contact with who might now also have the virus.  Also,  [employer] is Partnering with the [facility] for Covid-19 research, you may be contacted directly by research staff.\"    Assessment (Inquire about Patient's current symptoms)   Assessment   Current Symptoms at time of phone call: (if no symptoms, document No symptoms] Symptoms have resolved   Symptoms onset (if applicable) 10/28/2020      If at time of call, Patients symptoms hare worsened, the Patient should contact 911 or have someone drive them to Emergency Dept promptly:      If Patient calling 911, inform 911 personal that you have tested positive for the Coronavirus (COVID-19).  Place mask on and await 911 to arrive.    If Emergency Dept, If possible, please have another adult drive you to the Emergency Dept but you need to wear mask when in contact with other people.      Review information with Patient    Your result was positive. This means you have COVID-19 (coronavirus).  We have sent " you a letter that reviews the information that I'll be reviewing with you now.    How can I protect others?    If you have symptoms: stay home and away from others (self-isolate) until:    You've had no fever--and no medicine that reduces fever--for 1 full day (24 hours). And       Your other symptoms have gotten better. For example, your cough or breathing has improved. And     At least 10 days have passed since your symptoms started. (If you've been told by a doctor that you have a weak immune system, wait 20 days.)     If you don't have symptoms: Stay home and away from others (self-isolate) until at least 10 days have passed since your first positive COVID-19 test. (Date test collected)    During this time:    Stay in your own room, including for meals. Use your own bathroom if you can.    Stay away from others in your home. No hugging, kissing or shaking hands. No visitors.     Don't go to work, school or anywhere else.     Clean  high touch  surfaces often (doorknobs, counters, handles, etc.). Use a household cleaning spray or wipes. You'll find a full list on the EPA website at www.epa.gov/pesticide-registration/list-n-disinfectants-use-against-sars-cov-2.     Cover your mouth and nose with a mask, tissue or other face covering to avoid spreading germs.    Wash your hands and face often with soap and water.    Caregivers in these groups are at risk for severe illness due to COVID-19:  o People 65 years and older  o People who live in a nursing home or long-term care facility  o People with chronic disease (lung, heart, cancer, diabetes, kidney, liver, immunologic)  o People who have a weakened immune system, including those who:  - Are in cancer treatment  - Take medicine that weakens the immune system, such as corticosteroids  - Had a bone marrow or organ transplant  - Have an immune deficiency  - Have poorly controlled HIV or AIDS  - Are obese (body mass index of 40 or higher)  - Smoke  regularly    Caregivers should wear gloves while washing dishes, handling laundry and cleaning bedrooms and bathrooms.    Wash and dry laundry with special caution. Don't shake dirty laundry, and use the warmest water setting you can.    If you have a weakened immune system, ask your doctor about other actions you should take.    For more tips, go to www.cdc.gov/coronavirus/2019-ncov/downloads/10Things.pdf.    You should not go back to work until you meet the guidelines above for ending your home isolation. You don't need to be retested for COVID-19 before going back to work--studies show that you won't spread the virus if it's been at least 10 days since your symptoms started (or 20 days, if you have a weak immune system).    Employers: This document serves as formal notice of your employee's medical guidelines for going back to work. They must meet the above guidelines before going back to work in person.    How can I take care of myself?    1. Get lots of rest. Drink extra fluids (unless a doctor has told you not to).    2. Take Tylenol (acetaminophen) for fever or pain. If you have liver or kidney problems, ask your family doctor if it's okay to take Tylenol.     Take either:     650 mg (two 325 mg pills) every 4 to 6 hours, or     1,000 mg (two 500 mg pills) every 8 hours as needed.     Note: Don't take more than 3,000 mg in one day. Acetaminophen is found in many medicines (both prescribed and over-the-counter medicines). Read all labels to be sure you don't take too much.    For children, check the Tylenol bottle for the right dose (based on their age or weight).    3. If you have other health problems (like cancer, heart failure, an organ transplant or severe kidney disease): Call your specialty clinic if you don't feel better in the next 2 days.    4. Know when to call 911: Emergency warning signs include:    Trouble breathing or shortness of breath    Pain or pressure in the chest that doesn't go  away    Feeling confused like you haven't felt before, or not being able to wake up    Bluish-colored lips or face    5. Sign up for [employer]. We know it's scary to hear that you have COVID-19. We want to track your symptoms to make sure you're okay over the next 2 weeks. Please look for an email from [employer]--this is a free, online program that we'll use to keep in touch. To sign up, follow the link in the email. Learn more at [email or web address].    Where can I get more information?    [facility]: [email or web address]    Coronavirus Basics: www.health.[facility].[place]./diseases/coronavirus/basics.html    What to Do If You're Sick: www.cdc.gov/coronavirus/2019-ncov/about/steps-when-sick.html    Ending Home Isolation: www.cdc.gov/coronavirus/2019-ncov/hcp/disposition-in-home-patients.html     Caring for Someone with COVID-19: www.cdc.gov/coronavirus/2019-ncov/if-you-are-sick/care-for-someone.html     [facility] clinical trials (COVID-19 research studies): clinicalaffairs.[facility]-clinical-trials     A Positive COVID-19 letter will be sent via [employer] or the mail. (Exception, no letters sent to Presurgerical/Preprocedure Patients)    Esperanza Castañeda RN/[facility] Nurse Advisors      Additional Information    Negative: [1] Caller is not with the adult (patient) AND [2] reporting urgent symptoms    Negative: Lab result questions    Negative: Medication questions    Negative: Caller can't be reached by phone    Negative: Caller has already spoken to PCP or another triager    Negative: RN needs further essential information from caller in order to complete triage    Negative: Requesting regular office appointment    Negative: [1] Caller requesting NON-URGENT health information AND [2] PCP's office is the best resource    Health Information question, no triage required and triager able to answer question    Protocols used: INFORMATION ONLY CALL-A-

## 2021-01-09 ENCOUNTER — HEALTH MAINTENANCE LETTER (OUTPATIENT)
Age: 61
End: 2021-01-09

## 2021-01-12 ASSESSMENT — ENCOUNTER SYMPTOMS
ARTHRALGIAS: 1
FREQUENCY: 0
CHILLS: 0
JOINT SWELLING: 0
HEARTBURN: 1
DIZZINESS: 0
CONSTIPATION: 1
SHORTNESS OF BREATH: 0
MYALGIAS: 0
HEMATOCHEZIA: 0
DIARRHEA: 0
NERVOUS/ANXIOUS: 0
DYSURIA: 0
SORE THROAT: 0
NAUSEA: 0
PALPITATIONS: 0
EYE PAIN: 0
FEVER: 0
COUGH: 0
WEAKNESS: 0
HEADACHES: 0
ABDOMINAL PAIN: 0
HEMATURIA: 0
PARESTHESIAS: 0

## 2021-01-15 ENCOUNTER — OFFICE VISIT (OUTPATIENT)
Dept: FAMILY MEDICINE | Facility: CLINIC | Age: 61
End: 2021-01-15
Payer: COMMERCIAL

## 2021-01-15 VITALS
HEART RATE: 78 BPM | WEIGHT: 241 LBS | OXYGEN SATURATION: 95 % | HEIGHT: 70 IN | TEMPERATURE: 97.5 F | DIASTOLIC BLOOD PRESSURE: 68 MMHG | RESPIRATION RATE: 18 BRPM | SYSTOLIC BLOOD PRESSURE: 116 MMHG | BODY MASS INDEX: 34.5 KG/M2

## 2021-01-15 DIAGNOSIS — K21.9 GASTRIC REFLUX: ICD-10-CM

## 2021-01-15 DIAGNOSIS — D64.9 ANEMIA, UNSPECIFIED TYPE: ICD-10-CM

## 2021-01-15 DIAGNOSIS — Z98.84 GASTRIC BYPASS STATUS FOR OBESITY: ICD-10-CM

## 2021-01-15 DIAGNOSIS — Z00.00 ROUTINE GENERAL MEDICAL EXAMINATION AT A HEALTH CARE FACILITY: Primary | ICD-10-CM

## 2021-01-15 DIAGNOSIS — K92.1 BLOOD IN STOOL: ICD-10-CM

## 2021-01-15 LAB
ALBUMIN SERPL-MCNC: 3.7 G/DL (ref 3.4–5)
ALP SERPL-CCNC: 54 U/L (ref 40–150)
ALT SERPL W P-5'-P-CCNC: 28 U/L (ref 0–70)
ANION GAP SERPL CALCULATED.3IONS-SCNC: 3 MMOL/L (ref 3–14)
AST SERPL W P-5'-P-CCNC: 29 U/L (ref 0–45)
BASOPHILS # BLD AUTO: 0.1 10E9/L (ref 0–0.2)
BASOPHILS NFR BLD AUTO: 0.8 %
BILIRUB SERPL-MCNC: 0.4 MG/DL (ref 0.2–1.3)
BUN SERPL-MCNC: 17 MG/DL (ref 7–30)
CALCIUM SERPL-MCNC: 9.2 MG/DL (ref 8.5–10.1)
CHLORIDE SERPL-SCNC: 108 MMOL/L (ref 94–109)
CO2 SERPL-SCNC: 27 MMOL/L (ref 20–32)
CREAT SERPL-MCNC: 0.95 MG/DL (ref 0.66–1.25)
DIFFERENTIAL METHOD BLD: ABNORMAL
EOSINOPHIL NFR BLD AUTO: 2.9 %
ERYTHROCYTE [DISTWIDTH] IN BLOOD BY AUTOMATED COUNT: 14.6 % (ref 10–15)
FOLATE SERPL-MCNC: 14.7 NG/ML
GFR SERPL CREATININE-BSD FRML MDRD: 87 ML/MIN/{1.73_M2}
GLUCOSE SERPL-MCNC: 120 MG/DL (ref 70–99)
HCT VFR BLD AUTO: 37.7 % (ref 40–53)
HGB BLD-MCNC: 12.1 G/DL (ref 13.3–17.7)
IMM GRANULOCYTES # BLD: 0 10E9/L (ref 0–0.4)
IMM GRANULOCYTES NFR BLD: 0.3 %
IRON SATN MFR SERPL: 5 % (ref 15–46)
IRON SERPL-MCNC: 22 UG/DL (ref 35–180)
LYMPHOCYTES # BLD AUTO: 1.4 10E9/L (ref 0.8–5.3)
LYMPHOCYTES NFR BLD AUTO: 20.5 %
MCH RBC QN AUTO: 25.6 PG (ref 26.5–33)
MCHC RBC AUTO-ENTMCNC: 32.1 G/DL (ref 31.5–36.5)
MCV RBC AUTO: 80 FL (ref 78–100)
MONOCYTES # BLD AUTO: 0.6 10E9/L (ref 0–1.3)
MONOCYTES NFR BLD AUTO: 8.3 %
NEUTROPHILS # BLD AUTO: 4.5 10E9/L (ref 1.6–8.3)
NEUTROPHILS NFR BLD AUTO: 67.2 %
NRBC # BLD AUTO: 0 10*3/UL
NRBC BLD AUTO-RTO: 0 /100
PLATELET # BLD AUTO: 266 10E9/L (ref 150–450)
POTASSIUM SERPL-SCNC: 4.2 MMOL/L (ref 3.4–5.3)
PROT SERPL-MCNC: 7.4 G/DL (ref 6.8–8.8)
RBC # BLD AUTO: 4.73 10E12/L (ref 4.4–5.9)
SODIUM SERPL-SCNC: 138 MMOL/L (ref 133–144)
TIBC SERPL-MCNC: 441 UG/DL (ref 240–430)
VIT B12 SERPL-MCNC: 2540 PG/ML (ref 193–986)
WBC # BLD AUTO: 6.6 10E9/L (ref 4–11)

## 2021-01-15 PROCEDURE — 82306 VITAMIN D 25 HYDROXY: CPT | Performed by: FAMILY MEDICINE

## 2021-01-15 PROCEDURE — 82746 ASSAY OF FOLIC ACID SERUM: CPT | Performed by: FAMILY MEDICINE

## 2021-01-15 PROCEDURE — 87338 HPYLORI STOOL AG IA: CPT | Performed by: FAMILY MEDICINE

## 2021-01-15 PROCEDURE — 36415 COLL VENOUS BLD VENIPUNCTURE: CPT | Performed by: FAMILY MEDICINE

## 2021-01-15 PROCEDURE — 83540 ASSAY OF IRON: CPT | Performed by: FAMILY MEDICINE

## 2021-01-15 PROCEDURE — 99396 PREV VISIT EST AGE 40-64: CPT | Performed by: FAMILY MEDICINE

## 2021-01-15 PROCEDURE — 80053 COMPREHEN METABOLIC PANEL: CPT | Performed by: FAMILY MEDICINE

## 2021-01-15 PROCEDURE — 85025 COMPLETE CBC W/AUTO DIFF WBC: CPT | Performed by: FAMILY MEDICINE

## 2021-01-15 PROCEDURE — 83550 IRON BINDING TEST: CPT | Performed by: FAMILY MEDICINE

## 2021-01-15 PROCEDURE — 82607 VITAMIN B-12: CPT | Performed by: FAMILY MEDICINE

## 2021-01-15 RX ORDER — NICOTINE 14MG/24HR
PATCH, TRANSDERMAL 24 HOURS TRANSDERMAL
COMMUNITY

## 2021-01-15 ASSESSMENT — ENCOUNTER SYMPTOMS
FEVER: 0
PARESTHESIAS: 0
NERVOUS/ANXIOUS: 0
HEARTBURN: 1
SHORTNESS OF BREATH: 0
PALPITATIONS: 0
DYSURIA: 0
EYE PAIN: 0
NAUSEA: 0
CHILLS: 0
ABDOMINAL PAIN: 0
HEADACHES: 0
JOINT SWELLING: 0
COUGH: 0
HEMATOCHEZIA: 0
FREQUENCY: 0
DIARRHEA: 0
SORE THROAT: 0
DIZZINESS: 0
ARTHRALGIAS: 1
MYALGIAS: 0
HEMATURIA: 0
WEAKNESS: 0
CONSTIPATION: 1

## 2021-01-15 ASSESSMENT — MIFFLIN-ST. JEOR: SCORE: 1901.48

## 2021-01-15 ASSESSMENT — PAIN SCALES - GENERAL: PAINLEVEL: NO PAIN (0)

## 2021-01-15 NOTE — PROGRESS NOTES
SUBJECTIVE:   CC: Blake Gibbs is an 60 year old male who presents for preventative health visit.       Intentional weight loss of 35 lbs in 3 mos with attention to diet  Lots of reflux, despite diet changes, neg EGD, and dual therapy  Several months of L knee pain, inside   Patient has been advised of split billing requirements and indicates understanding: Yes  Healthy Habits:     Getting at least 3 servings of Calcium per day:  Yes    Bi-annual eye exam:  Yes    Dental care twice a year:  Yes    Sleep apnea or symptoms of sleep apnea:  None    Diet:  Gluten-free/reduced    Frequency of exercise:  4-5 days/week    Duration of exercise:  30-45 minutes    Taking medications regularly:  Yes    Medication side effects:  None    PHQ-2 Total Score: 0    Additional concerns today:  Yes              Today's PHQ-2 Score:   PHQ-2 ( 1999 [employer]) 1/12/2021   Q1: Little interest or pleasure in doing things 0   Q2: Feeling down, depressed or hopeless 0   PHQ-2 Score 0   Q1: Little interest or pleasure in doing things Not at all   Q2: Feeling down, depressed or hopeless Not at all   PHQ-2 Score 0       Abuse: Current or Past(Physical, Sexual or Emotional)- No  Do you feel safe in your environment? Yes    Have you ever done Advance Care Planning? (For example, a Health Directive, POLST, or a discussion with a medical provider or your loved ones about your wishes): No, advance care planning information given to patient to review.  Patient plans to discuss their wishes with loved ones or provider.      Social History     Tobacco Use     Smoking status: Former Smoker     Years: 24.00     Smokeless tobacco: Never Used     Tobacco comment: quit at 18yrs old   Substance Use Topics     Alcohol use: No     Alcohol/week: 0.0 standard drinks     If you drink alcohol do you typically have >3 drinks per day or >7 drinks per week? No    No flowsheet data found.    Last PSA:   PSA   Date Value Ref Range Status   07/22/2009 0.43 0 - 4 ug/L  "Final       Reviewed orders with patient. Reviewed health maintenance and updated orders accordingly -       Reviewed and updated as needed this visit by clinical staff  Tobacco  Allergies  Meds              Reviewed and updated as needed this visit by Provider                    Review of Systems   Constitutional: Negative for chills and fever.   HENT: Negative for congestion, ear pain, hearing loss and sore throat.    Eyes: Negative for pain and visual disturbance.   Respiratory: Negative for cough and shortness of breath.    Cardiovascular: Negative for chest pain, palpitations and peripheral edema.   Gastrointestinal: Positive for constipation and heartburn. Negative for abdominal pain, diarrhea, hematochezia and nausea.   Genitourinary: Negative for discharge, dysuria, frequency, genital sores, hematuria, impotence and urgency.   Musculoskeletal: Positive for arthralgias. Negative for joint swelling and myalgias.   Skin: Negative for rash.   Neurological: Negative for dizziness, weakness, headaches and paresthesias.   Psychiatric/Behavioral: Negative for mood changes. The patient is not nervous/anxious.          OBJECTIVE:   /68   Pulse 78   Temp 97.5  F (36.4  C) (Temporal)   Resp 18   Ht 1.765 m (5' 9.5\")   Wt 109.3 kg (241 lb)   SpO2 95%   BMI 35.08 kg/m      Physical Exam  GENERAL: healthy, alert and no distress  EYES: Eyes grossly normal to inspection, PERRL and conjunctivae and sclerae normal  HENT: ear canals and TM's normal, nose and mouth without ulcers or lesions  NECK: no adenopathy, no asymmetry, masses, or scars and thyroid normal to palpation  RESP: lungs clear to auscultation - no rales, rhonchi or wheezes  CV: regular rate and rhythm, normal S1 S2, no S3 or S4, no murmur, click or rub, no peripheral edema and peripheral pulses strong  ABDOMEN: soft, nontender, no hepatosplenomegaly, no masses and bowel sounds normal  MS: no gross musculoskeletal defects noted, no " "edema    Diagnostic Test Results:  Labs reviewed in Epic    ASSESSMENT/PLAN:       ICD-10-CM    1. Routine general medical examination at a health care facility  Z00.00    2. Gastric reflux  K21.9 esomeprazole (NEXIUM) 20 MG DR capsule     Saccharomyces boulardii (PROBIOTIC) 250 MG CAPS     GASTROENTEROLOGY ADULT REF CONSULT ONLY     Helicobacter pylori Antigen Stool     Helicobacter pylori Antigen Stool   3. Anemia, unspecified type  D64.9    4. Gastric bypass status for obesity  Z98.84 Vitamin D Deficiency     Folate     Vitamin B12     Iron and iron binding capacity     Comprehensive metabolic panel (BMP + Alb, Alk Phos, ALT, AST, Total. Bili, TP)     CBC with platelets and differential   5. Blood in stool  K92.1      Annual topics reviewed.  Immunizations and cancer screening up-to-date.  Reviewed in particular his recent normal colonoscopy  Continues to struggle with pretty significant acid reflux.  He has been taking his PPI an hour before his meal and, and I asked him to moved up to 30 minutes.  We also added an H2 blocker twice a day and doubled his PPI dose.  We will work this up with an H. pylori test.  Thankfully his upper endoscopy was unremarkable.  He continues to lose weight, but that is with careful attention to diet try to get rid of this reflux.  If all of this comes up negative and his medications do not resolve the issue, he should see GI    Due for Amado-en-Y routine labs and these were ordered, further recommendations to follow  Patient has been advised of split billing requirements and indicates understanding:   COUNSELING:   Reviewed preventive health counseling, as reflected in patient instructions    Estimated body mass index is 35.08 kg/m  as calculated from the following:    Height as of this encounter: 1.765 m (5' 9.5\").    Weight as of this encounter: 109.3 kg (241 lb).         He reports that he has quit smoking. He quit after 24.00 years of use. He has never used smokeless " tobacco.      Counseling Resources:  ATP IV Guidelines  Pooled Cohorts Equation Calculator  FRAX Risk Assessment  ICSI Preventive Guidelines  Dietary Guidelines for Americans, 2010  [employer] MyPlate  ASA Prophylaxis  Lung CA Screening    Hakeem Rebolledo MD  [facility]

## 2021-01-15 NOTE — PATIENT INSTRUCTIONS
1. For reflux take nexium 40mg daily and famotidine twice daily for the next 2 wks      Preventive Health Recommendations  Male Ages 50 - 64    Yearly exam:             See your health care provider every year in order to  o   Review health changes.   o   Discuss preventive care.    o   Review your medicines if your doctor has prescribed any.     Have a cholesterol test every 5 years, or more frequently if you are at risk for high cholesterol/heart disease.     Have a diabetes test (fasting glucose) every three years. If you are at risk for diabetes, you should have this test more often.     Have a colonoscopy at age 50, or have a yearly FIT test (stool test). These exams will check for colon cancer.      Talk with your health care provider about whether or not a prostate cancer screening test (PSA) is right for you.    You should be tested each year for STDs (sexually transmitted diseases), if you re at risk.     Shots: Get a flu shot each year. Get a tetanus shot every 10 years.     Nutrition:    Eat at least 5 servings of fruits and vegetables daily.     Eat whole-grain bread, whole-wheat pasta and brown rice instead of white grains and rice.     Get adequate Calcium and Vitamin D.     Lifestyle    Exercise for at least 150 minutes a week (30 minutes a day, 5 days a week). This will help you control your weight and prevent disease.     Limit alcohol to one drink per day.     No smoking.     Wear sunscreen to prevent skin cancer.     See your dentist every six months for an exam and cleaning.     See your eye doctor every 1 to 2 years.

## 2021-01-18 LAB
DEPRECATED CALCIDIOL+CALCIFEROL SERPL-MC: 59 UG/L (ref 20–75)
H PYLORI AG STL QL IA: NEGATIVE

## 2021-01-28 NOTE — TELEPHONE ENCOUNTER
REFERRAL INFORMATION:    Referring Provider:  Dr. Hakeem Rebolledo     Referring Clinic:  [facility]     Reason for Visit/Diagnosis: Gastric Reflux      FUTURE VISIT INFORMATION:    Appointment Date: 2/18/2021    Appointment Time: 8:50 AM      NOTES STATUS DETAILS   OFFICE NOTE from Referring Provider Internal 1/15/2021, 9/12/18 Office visit with Dr. Rebolledo    OFFICE NOTE from Other Specialist N/A    HOSPITAL DISCHARGE SUMMARY/  ED VISITS N/A    OPERATIVE REPORT N/A    MEDICATION LIST Internal         ENDOSCOPY  Internal EGD: 7/2/2020   COLONOSCOPY Internal/ Received  7/2/2020  4/13/10 ([facility])    ERCP N/A    EUS N/A    STOOL TESTING Internal 1/15/2021   PERTINENT LABS Internal    PATHOLOGY REPORTS (RELATED) Internal 7/2/2020   IMAGING (CT, MRI, EGD, MRCP, Small Bowel Follow Through/SBT, MR/CT Enterography) N/A

## 2021-02-18 ENCOUNTER — PRE VISIT (OUTPATIENT)
Dept: GASTROENTEROLOGY | Facility: CLINIC | Age: 61
End: 2021-02-18

## 2021-02-18 ENCOUNTER — VIRTUAL VISIT (OUTPATIENT)
Dept: GASTROENTEROLOGY | Facility: CLINIC | Age: 61
End: 2021-02-18
Attending: FAMILY MEDICINE
Payer: COMMERCIAL

## 2021-02-18 VITALS — HEIGHT: 69 IN | BODY MASS INDEX: 34.8 KG/M2 | WEIGHT: 235 LBS

## 2021-02-18 DIAGNOSIS — Z98.84 H/O GASTRIC BYPASS: Primary | ICD-10-CM

## 2021-02-18 DIAGNOSIS — K21.9 GASTRIC REFLUX: ICD-10-CM

## 2021-02-18 PROCEDURE — 99204 OFFICE O/P NEW MOD 45 MIN: CPT | Mod: 95 | Performed by: INTERNAL MEDICINE

## 2021-02-18 ASSESSMENT — MIFFLIN-ST. JEOR: SCORE: 1866.33

## 2021-02-18 NOTE — NURSING NOTE
"Chief Complaint   Patient presents with     New Patient     new consult       Vitals:    02/18/21 0833   Weight: 106.6 kg (235 lb)   Height: 1.753 m (5' 9\")       Body mass index is 34.7 kg/m .    Kavita Braun CMA    "

## 2021-02-18 NOTE — LETTER
"    2/18/2021         RE: Blake Gibbs  [address] [facility] [address]        Dear Colleague,    Thank you for referring your patient, Blake Gibbs, to the [facility] [place]. Please see a copy of my visit note below.    Blake Gibbs is a 60 year old male who is being evaluated via a billable video visit.      The patient has been notified of following:     \"This video visit will be conducted via a call between you and your physician/provider. We have found that certain health care needs can be provided without the need for an in-person physical exam.  This service lets us provide the care you need with a video conversation.  If a prescription is necessary we can send it directly to your pharmacy.  If lab work is needed we can place an order for that and you can then stop by our lab to have the test done at a later time.    If during the course of the call the physician/provider feels a video visit is not appropriate, you will not be charged for this service.\"     Patient confirmed that they are in [place] for today's visit yes.      Video-Visit Details  Type of service:  Video Visit    Video Start Time: 09:00  Video End Time:  09:45    Originating Location (pt. Location): Home    Distant Location (provider location):  [facility] [place]     Platform used: [employer]      Gastroenterology Consult   [facility]            Chief Complaint:     \"Reflux\"         ASSESSMENT AND RECOMMENDATIONS:   Assessment:  60 year old male sp gastric bypass over 15 years ago without prior GERD who developed symptoms cw with acid reflux that have responded to typical treatments for GERD.  EGD this year was unremarkable, however very limited information about the patients current foregut anatomy.       Recommendations:  Barium Esophagram   Increase PPI to Esomeprazole 20 mg po BID, if ineffective immediately try to increase to 40 mg po BID. " "  Consider EGD with Bravo off PPI for at least a week if Barium is normal.               History of Present Illness:   Blake Gibbs is a 60 year old male with a history of  Gastric Bypass 2001.  With the rou en y he is dealing with acid reflux thought he was having \"heart problems\"    Has been on esomeprazole since then, Now on a gluten free diet.  Stays away from foods that cause reflux.     A little bit of burning in his throat.  Feels something a drink of water.  Water helps.  Moisturizes that area washes his throat.     Only gets acid reflux he sleeps sitting up, if he starts sliding down a little be he feels.      Breathing something hot, burning, sour taste. He stops eating by 6:30 at night.  235 lbs    Lot of weight lifting.  Four of five days a week.       Esoph    Stomach was stomach was on edge.  He took 40 mg times two weeks.  Made it better.      Family members that were gluten free.  Had good gut health.  He is trying to process what he eliminates from his diet.        Mom had esophagus removed for acid-reflux,  Barretts - HGD total esophagectomy.         Never had heartburn prior to his Bypass.      [employer] G[employer].      EGD 7/2/2020  Findings:        The Z-line was irregular. Biopsies were taken with a cold forceps for        histology.        Evidence of a Amado-en-Y gastrojejunostomy was found. The gastrojejunal        anastomosis was characterized by healthy appearing mucosa. This was        traversed. The pouch-to-jejunum limb was characterized by healthy        appearing mucosa.                                                                                     Impression:          - Z-line irregular. Biopsied.                        - Amado-en-Y gastrojejunostomy with gastrojejunal                        anastomosis characterized by healthy appearing mucosa.   Recommendation:      - Discharge patient to home.                        - Resume previous diet.                        - Continue present " medications.                        - Await pathology results.                        - Return to primary care physician.     Pathology  7/2/2020  SPECIMEN(S):   Gastroesophageal junction biopsy     FINAL DIAGNOSIS:   GE Junction, biopsy-   -Stratified squamous mucosa without significant pathologic alterations   -Glandular cardia type mucosa with mild nonspecific chronic inflammation   -Negative for Farr's metaplasia, dysplasia or malignancy           Colonoscopy 7/2/2020                                                                            Findings:        Hemorrhoids were found on perianal exam.        The colon (entire examined portion) appeared normal.                                                                                     Impression:          - Hemorrhoids found on perianal exam.                        - The entire examined colon is normal.                        - No specimens collected.   Recommendation:      - Discharge patient to home.                        - High fiber diet and low fat diet.                        - Continue present medications.                        - Repeat colonoscopy in 10 years for screening purposes.                                                                                           Past Medical History:     Past Medical History:   Diagnosis Date     Allergic rhinitis, cause unspecified     Allergic rhinitis     Calculus of ureter 01/16/09    discharged 01/17/09     Hydronephrosis      Obesity, unspecified     Obesity            Past Surgical History:     Past Surgical History:   Procedure Laterality Date     C REMV STOMACH,PART,DISTAL,SINCERE-EN-Y       COLONOSCOPY  04/13/10     COLONOSCOPY N/A 7/2/2020    Procedure: COLONOSCOPY;  Surgeon: Gio Lackey DO;  Location:  GI     CYSTOSCOPY,+URETEROSCOPY  01/14/09-    [place]     CYSTOSCOPY,DIL URETHRAL STRICTURE  1991,1998     ESOPHAGOSCOPY, GASTROSCOPY, DUODENOSCOPY (EGD), COMBINED N/A 7/2/2020     Procedure: Esophagogastroduodenoscopy with biopsy,;  Surgeon: Gio Lackey DO;  Location: [facility]     GASTRIC BYPASS  2003     HC LIGATE & DIVISION LONG SAPH VEIN SEP-FEM JUNC, DISTAL INTERUPT  11/29/2004     Left leg     HC VASECTOMY UNILAT/BILAT W POSTOP SEMEN      Partial bilateral vasectomy     ZZC NONSPECIFIC PROCEDURE  1963    Double hernia            Previous Endoscopy:     Results for orders placed or performed during the hospital encounter of 07/02/20   COLONOSCOPY   Result Value Ref Range    COLONOSCOPY       [place] [phone]     Endoscopy Department  _______________________________________________________________________________  Patient Name: Blake Gibbs          Procedure Date: 7/2/2020 10:13 AM  MRN: [id number]                       Account Number: [id number]  YOB: 1960              Admit Type: Outpatient  Age: 60                               Room: Room 2  Gender: Male                          Note Status: Finalized  Attending MD: Gio Lackey MD  Total Sedation Time:   _______________________________________________________________________________     Procedure:           Colonoscopy  Indications:         Screening for colorectal malignant neoplasm  Providers:           Gio Lackey MD  Referring MD:          Medicines:           Monitored Anesthesia Care  Complications:       No immediate complications. Estimated blood loss: None.  ______________________________________________________ _________________________  Procedure:           Pre-Anesthesia Assessment:                       - Prior to the procedure, a History and Physical was                        performed, and patient medications, allergies and                        sensitivities were reviewed. The patient's tolerance of                        previous anesthesia was reviewed.                       - The risks and benefits of  the procedure and the                        sedation options and risks were discussed with the                        patient. All questions were answered and informed                        consent was obtained.                       - After reviewing the risks and benefits, the patient                        was deemed in satisfactory condition to undergo the                        procedure.                       After obtaining informed consent, the colonoscope was                        passed under direct vision. Throughout the procedure,                        the patient's bl ood pressure, pulse, and oxygen                        saturations were monitored continuously. The Colonoscope                        was introduced through the anus and advanced to the                        cecum, identified by appendiceal orifice and ileocecal                        valve. The colonoscopy was performed without difficulty.                        The patient tolerated the procedure well. The quality of                        the bowel preparation was good.                                                                                   Findings:       Hemorrhoids were found on perianal exam.       The colon (entire examined portion) appeared normal.                                                                                   Impression:          - Hemorrhoids found on perianal exam.                       - The entire examined colon is normal.                       - No specimens collected.  Recommendation:      - Discharge patient to home.                        - High fiber diet and low fat diet.                       - Continue present medications.                       - Repeat colonoscopy in 10 years for screening purposes.                                                                                     _____________________  Gio Lackey MD  7/2/2020 10:30:36 AM  Number of Addenda: 0    Note  Initiated On: 7/2/2020 10:13 AM              Social History:     Social History     Socioeconomic History     Marital status:      Spouse name: Alejandra     Number of children: 4     Years of education: None     Highest education level: None   Occupational History     Occupation:    Social Needs     Financial resource strain: None     Food insecurity     Worry: None     Inability: None     Transportation needs     Medical: None     Non-medical: None   Tobacco Use     Smoking status: Former Smoker     Years: 24.00     Smokeless tobacco: Never Used     Tobacco comment: quit at 18yrs old   Substance and Sexual Activity     Alcohol use: No     Alcohol/week: 0.0 standard drinks     Drug use: No     Sexual activity: Yes     Partners: Female     Birth control/protection: Surgical   Lifestyle     Physical activity     Days per week: None     Minutes per session: None     Stress: None   Relationships     Social connections     Talks on phone: None     Gets together: None     Attends Congregational service: None     Active member of club or organization: None     Attends meetings of clubs or organizations: None     Relationship status: None     Intimate partner violence     Fear of current or ex partner: None     Emotionally abused: None     Physically abused: None     Forced sexual activity: None   Other Topics Concern      Service No     Blood Transfusions No     Caffeine Concern No     Occupational Exposure Not Asked     Hobby Hazards Not Asked     Sleep Concern No     Stress Concern Yes     Weight Concern No     Special Diet Not Asked     Back Care Not Asked     Exercise Yes     Bike Helmet Not Asked     Seat Belt Yes     Self-Exams Not Asked     Parent/sibling w/ CABG, MI or angioplasty before 65F 55M? Not Asked   Social History Narrative     None            Family History:     Family History   Problem Relation Age of Onset     Gynecology Mother         Hyst     Alcohol/Drug Other         alcohol and drug  "    Cancer Paternal Grandfather         prostate     Heart Disease Paternal Uncle         Tacycardia--     No known history of colorectal cancer, liver disease, or inflammatory bowel disease.         Allergies:   Reviewed and edited as appropriate     Allergies   Allergen Reactions     No Known Drug Allergies             Medications:     Current Outpatient Medications   Medication Sig Dispense Refill     Cyanocobalamin (VITAMIN  B-12) 2500 MCG tablet Place 2,500 mcg under the tongue daily       esomeprazole (NEXIUM) 20 MG DR capsule        Glucos-Chondroit-Collag-Hyal (GLUCOSAMINE CHONDROIT-COLLAGEN) CAPS        Multiple Vitamin (MULTIVITAMINS PO) Take  by mouth.       Saccharomyces boulardii (PROBIOTIC) 250 MG CAPS                Review of Systems:     A complete 10 point review of systems was performed and is negative except as noted in the HPI           Physical Exam:   Ht 1.753 m (5' 9\")   Wt 106.6 kg (235 lb)   BMI 34.70 kg/m    Wt:   Wt Readings from Last 2 Encounters:   21 106.6 kg (235 lb)   01/15/21 109.3 kg (241 lb)      Constitutional: cooperative, pleasant, not dyspneic/diaphoretic, no acute distress  Eyes: Sclera anicteric/injected  Respiratory: Unlabored breathing  Skin:no jaundice  Neuro: AAO x 3, No asterixis  Psych: Normal affect  MSK: No gross deformities         Data:         Noam Tejada MD  Associate Professor of Medicine  Division of Gastroenterology, Hepatology, and Nutrition  [facility]    \  "

## 2021-02-18 NOTE — PROGRESS NOTES
"Blake Gibbs is a 60 year old male who is being evaluated via a billable video visit.      The patient has been notified of following:     \"This video visit will be conducted via a call between you and your physician/provider. We have found that certain health care needs can be provided without the need for an in-person physical exam.  This service lets us provide the care you need with a video conversation.  If a prescription is necessary we can send it directly to your pharmacy.  If lab work is needed we can place an order for that and you can then stop by our lab to have the test done at a later time.    If during the course of the call the physician/provider feels a video visit is not appropriate, you will not be charged for this service.\"     Patient confirmed that they are in [place] for today's visit yes.      Video-Visit Details  Type of service:  Video Visit    Video Start Time: 09:00  Video End Time:  09:45    Originating Location (pt. Location): [place]    Distant Location (provider location):  [facility] [place]     Platform used: Azingo      Gastroenterology Consult   [facility]            Chief Complaint:     \"Reflux\"         ASSESSMENT AND RECOMMENDATIONS:   Assessment:  60 year old male sp gastric bypass over 15 years ago without prior GERD who developed symptoms cw with acid reflux that have responded to typical treatments for GERD.  EGD this year was unremarkable, however very limited information about the patients current foregut anatomy.       Recommendations:  Barium Esophagram   Increase PPI to Esomeprazole 20 mg po BID, if ineffective immediately try to increase to 40 mg po BID.   Consider EGD with Bravo off PPI for at least a week if Barium is normal.               History of Present Illness:   Blake Gibbs is a 60 year old male with a history of  Gastric Bypass 2001.  With the rou en y he is dealing with acid reflux thought he was having \"heart " "problems\"    Has been on esomeprazole since then, Now on a gluten free diet.  Stays away from foods that cause reflux.     A little bit of burning in his throat.  Feels something a drink of water.  Water helps.  Moisturizes that area washes his throat.     Only gets acid reflux he sleeps sitting up, if he starts sliding down a little be he feels.      Breathing something hot, burning, sour taste. He stops eating by 6:30 at night.  235 lbs    Lot of weight lifting.  Four of five days a week.       Esoph    Stomach was stomach was on edge.  He took 40 mg times two weeks.  Made it better.      Family members that were gluten free.  Had good gut health.  He is trying to process what he eliminates from his diet.        Mom had esophagus removed for acid-reflux,  Barretts - HGD total esophagectomy.         Never had heartburn prior to his Bypass.      Hardcore GFD.      EGD 7/2/2020  Findings:        The Z-line was irregular. Biopsies were taken with a cold forceps for        histology.        Evidence of a Amado-en-Y gastrojejunostomy was found. The gastrojejunal        anastomosis was characterized by healthy appearing mucosa. This was        traversed. The pouch-to-jejunum limb was characterized by healthy        appearing mucosa.                                                                                     Impression:          - Z-line irregular. Biopsied.                        - Amado-en-Y gastrojejunostomy with gastrojejunal                        anastomosis characterized by healthy appearing mucosa.   Recommendation:      - Discharge patient to home.                        - Resume previous diet.                        - Continue present medications.                        - Await pathology results.                        - Return to primary care physician.     Pathology  7/2/2020  SPECIMEN(S):   Gastroesophageal junction biopsy     FINAL DIAGNOSIS:   GE Junction, biopsy-   -Stratified squamous mucosa without " significant pathologic alterations   -Glandular cardia type mucosa with mild nonspecific chronic inflammation   -Negative for Farr's metaplasia, dysplasia or malignancy           Colonoscopy 7/2/2020                                                                            Findings:        Hemorrhoids were found on perianal exam.        The colon (entire examined portion) appeared normal.                                                                                     Impression:          - Hemorrhoids found on perianal exam.                        - The entire examined colon is normal.                        - No specimens collected.   Recommendation:      - Discharge patient to home.                        - High fiber diet and low fat diet.                        - Continue present medications.                        - Repeat colonoscopy in 10 years for screening purposes.                                                                                           Past Medical History:     Past Medical History:   Diagnosis Date     Allergic rhinitis, cause unspecified     Allergic rhinitis     Calculus of ureter 01/16/09    discharged 01/17/09     Hydronephrosis      Obesity, unspecified     Obesity            Past Surgical History:     Past Surgical History:   Procedure Laterality Date     C REMV STOMACH,PART,DISTAL,SINCERE-EN-Y       COLONOSCOPY  04/13/10     COLONOSCOPY N/A 7/2/2020    Procedure: COLONOSCOPY;  Surgeon: Gio Lackey, ;  Location:  GI     CYSTOSCOPY,+URETEROSCOPY  01/14/09-    [place]     CYSTOSCOPY,DIL URETHRAL STRICTURE  1991,1998     ESOPHAGOSCOPY, GASTROSCOPY, DUODENOSCOPY (EGD), COMBINED N/A 7/2/2020    Procedure: Esophagogastroduodenoscopy with biopsy,;  Surgeon: Gio Lackey DO;  Location:  GI     GASTRIC BYPASS  2003     HC LIGATE & DIVISION LONG SAPH VEIN SEP-FEM JUNC, DISTAL INTERUPT  11/29/2004     Left leg     HC VASECTOMY UNILAT/BILAT W POSTOP SEMEN       Partial bilateral vasectomy     ZZC NONSPECIFIC PROCEDURE  1963    Double hernia            Previous Endoscopy:     Results for orders placed or performed during the hospital encounter of 07/02/20   COLONOSCOPY   Result Value Ref Range    COLONOSCOPY       [facility], [place]  [facility] [place] [address] [phone]     Endoscopy Department  _______________________________________________________________________________  Patient Name: Blake Gibbs          Procedure Date: 7/2/2020 10:13 AM  MRN: [id number]                       Account Number: [id number]  YOB: 1960              Admit Type: Outpatient  Age: 60                               Room: Room 2  Gender: Male                          Note Status: Finalized  Attending MD: Gio Lackey MD  Total Sedation Time:   _______________________________________________________________________________     Procedure:           Colonoscopy  Indications:         Screening for colorectal malignant neoplasm  Providers:           Gio Lackey MD  Referring MD:          Medicines:           Monitored Anesthesia Care  Complications:       No immediate complications. Estimated blood loss: None.  ______________________________________________________ _________________________  Procedure:           Pre-Anesthesia Assessment:                       - Prior to the procedure, a History and Physical was                        performed, and patient medications, allergies and                        sensitivities were reviewed. The patient's tolerance of                        previous anesthesia was reviewed.                       - The risks and benefits of the procedure and the                        sedation options and risks were discussed with the                        patient. All questions were answered and informed                        consent was obtained.                       - After reviewing the risks and benefits,  the patient                        was deemed in satisfactory condition to undergo the                        procedure.                       After obtaining informed consent, the colonoscope was                        passed under direct vision. Throughout the procedure,                        the patient's bl ood pressure, pulse, and oxygen                        saturations were monitored continuously. The Colonoscope                        was introduced through the anus and advanced to the                        cecum, identified by appendiceal orifice and ileocecal                        valve. The colonoscopy was performed without difficulty.                        The patient tolerated the procedure well. The quality of                        the bowel preparation was good.                                                                                   Findings:       Hemorrhoids were found on perianal exam.       The colon (entire examined portion) appeared normal.                                                                                   Impression:          - Hemorrhoids found on perianal exam.                       - The entire examined colon is normal.                       - No specimens collected.  Recommendation:      - Discharge patient to home.                        - High fiber diet and low fat diet.                       - Continue present medications.                       - Repeat colonoscopy in 10 years for screening purposes.                                                                                     _____________________  Gio Lackey MD  7/2/2020 10:30:36 AM  Number of Addenda: 0    Note Initiated On: 7/2/2020 10:13 AM              Social History:     Social History     Socioeconomic History     Marital status:      Spouse name: Alejandra     Number of children: 4     Years of education: None     Highest education level: None   Occupational History      Occupation:    Social Needs     Financial resource strain: None     Food insecurity     Worry: None     Inability: None     Transportation needs     Medical: None     Non-medical: None   Tobacco Use     Smoking status: Former Smoker     Years: 24.00     Smokeless tobacco: Never Used     Tobacco comment: quit at 18yrs old   Substance and Sexual Activity     Alcohol use: No     Alcohol/week: 0.0 standard drinks     Drug use: No     Sexual activity: Yes     Partners: Female     Birth control/protection: Surgical   Lifestyle     Physical activity     Days per week: None     Minutes per session: None     Stress: None   Relationships     Social connections     Talks on phone: None     Gets together: None     Attends [employer] service: None     Active member of club or organization: None     Attends meetings of clubs or organizations: None     Relationship status: None     Intimate partner violence     Fear of current or ex partner: None     Emotionally abused: None     Physically abused: None     Forced sexual activity: None   Other Topics Concern      Service No     Blood Transfusions No     Caffeine Concern No     Occupational Exposure Not Asked     Hobby Hazards Not Asked     Sleep Concern No     Stress Concern Yes     Weight Concern No     Special Diet Not Asked     Back Care Not Asked     Exercise Yes     Bike Helmet Not Asked     Seat Belt Yes     Self-Exams Not Asked     Parent/sibling w/ CABG, MI or angioplasty before 65F 55M? Not Asked   Social History Narrative     None            Family History:     Family History   Problem Relation Age of Onset     Gynecology Mother         Hyst     Alcohol/Drug Other         alcohol and drug     Cancer Paternal Grandfather         prostate     Heart Disease Paternal Uncle         Tacycardia--     No known history of colorectal cancer, liver disease, or inflammatory bowel disease.         Allergies:   Reviewed and edited as appropriate     Allergies  "  Allergen Reactions     No Known Drug Allergies             Medications:     Current Outpatient Medications   Medication Sig Dispense Refill     Cyanocobalamin (VITAMIN  B-12) 2500 MCG tablet Place 2,500 mcg under the tongue daily       esomeprazole (NEXIUM) 20 MG DR capsule        Glucos-Chondroit-Collag-Hyal (GLUCOSAMINE CHONDROIT-COLLAGEN) CAPS        Multiple Vitamin (MULTIVITAMINS PO) Take  by mouth.       Saccharomyces boulardii (PROBIOTIC) 250 MG CAPS                Review of Systems:     A complete 10 point review of systems was performed and is negative except as noted in the HPI           Physical Exam:   Ht 1.753 m (5' 9\")   Wt 106.6 kg (235 lb)   BMI 34.70 kg/m    Wt:   Wt Readings from Last 2 Encounters:   02/18/21 106.6 kg (235 lb)   01/15/21 109.3 kg (241 lb)      Constitutional: cooperative, pleasant, not dyspneic/diaphoretic, no acute distress  Eyes: Sclera anicteric/injected  Respiratory: Unlabored breathing  Skin:no jaundice  Neuro: AAO x 3, No asterixis  Psych: Normal affect  MSK: No gross deformities         Data:         Noam Tejada MD  Associate Professor of Medicine  Division of Gastroenterology, Hepatology, and Nutrition  [facility]          "

## 2021-10-23 ENCOUNTER — HEALTH MAINTENANCE LETTER (OUTPATIENT)
Age: 61
End: 2021-10-23

## 2022-04-09 ENCOUNTER — HEALTH MAINTENANCE LETTER (OUTPATIENT)
Age: 62
End: 2022-04-09

## 2022-06-15 ENCOUNTER — TELEPHONE (OUTPATIENT)
Dept: FAMILY MEDICINE | Facility: CLINIC | Age: 62
End: 2022-06-15
Payer: COMMERCIAL

## 2022-06-15 DIAGNOSIS — Z98.84 GASTRIC BYPASS STATUS FOR OBESITY: Primary | ICD-10-CM

## 2022-06-15 DIAGNOSIS — Z00.00 ROUTINE GENERAL MEDICAL EXAMINATION AT A HEALTH CARE FACILITY: ICD-10-CM

## 2022-06-15 NOTE — TELEPHONE ENCOUNTER
Reason for Call: Request for an order or referral:    Order or referral being requested: physical labs    Date needed: at your convenience    Has the patient been seen by the PCP for this problem? n/a    Additional comments: PT is wondering if you would like him to come in prior to his physical for lab testing.     Phone number Patient can be reached at:  Cell number on file:    Telephone Information:   Mobile [phone]       Best Time:  anytime    Can we leave a detailed message on this number?  YES    Call taken on 6/15/2022 at 4:29 PM by Lauren Keyes

## 2022-06-17 NOTE — TELEPHONE ENCOUNTER
Detailed message left on patients voicemail, instructed to scheduled LAB ONLY appointment.    Heavenly Porter XRO/

## 2022-07-19 ENCOUNTER — OFFICE VISIT (OUTPATIENT)
Dept: FAMILY MEDICINE | Facility: CLINIC | Age: 62
End: 2022-07-19
Payer: COMMERCIAL

## 2022-07-19 VITALS
HEART RATE: 86 BPM | TEMPERATURE: 97.4 F | BODY MASS INDEX: 38.25 KG/M2 | SYSTOLIC BLOOD PRESSURE: 124 MMHG | RESPIRATION RATE: 16 BRPM | OXYGEN SATURATION: 92 % | WEIGHT: 259 LBS | DIASTOLIC BLOOD PRESSURE: 76 MMHG

## 2022-07-19 DIAGNOSIS — Z00.00 ROUTINE GENERAL MEDICAL EXAMINATION AT A HEALTH CARE FACILITY: ICD-10-CM

## 2022-07-19 DIAGNOSIS — R10.9 LEFT FLANK PAIN: ICD-10-CM

## 2022-07-19 DIAGNOSIS — Z98.84 GASTRIC BYPASS STATUS FOR OBESITY: Primary | ICD-10-CM

## 2022-07-19 LAB
ALBUMIN UR-MCNC: NEGATIVE MG/DL
ANION GAP SERPL CALCULATED.3IONS-SCNC: 4 MMOL/L (ref 3–14)
APPEARANCE UR: CLEAR
BILIRUB UR QL STRIP: NEGATIVE
BUN SERPL-MCNC: 8 MG/DL (ref 7–30)
CALCIUM SERPL-MCNC: 8.7 MG/DL (ref 8.5–10.1)
CHLORIDE BLD-SCNC: 108 MMOL/L (ref 94–109)
CHOLEST SERPL-MCNC: 152 MG/DL
CO2 SERPL-SCNC: 27 MMOL/L (ref 20–32)
COLOR UR AUTO: YELLOW
CREAT SERPL-MCNC: 0.99 MG/DL (ref 0.66–1.25)
ERYTHROCYTE [DISTWIDTH] IN BLOOD BY AUTOMATED COUNT: 15.9 % (ref 10–15)
FASTING STATUS PATIENT QL REPORTED: NO
FERRITIN SERPL-MCNC: 7 NG/ML (ref 26–388)
GFR SERPL CREATININE-BSD FRML MDRD: 86 ML/MIN/1.73M2
GLUCOSE BLD-MCNC: 87 MG/DL (ref 70–99)
GLUCOSE UR STRIP-MCNC: NEGATIVE MG/DL
HBA1C MFR BLD: 5.3 % (ref 0–5.6)
HCT VFR BLD AUTO: 39.4 % (ref 40–53)
HDLC SERPL-MCNC: 48 MG/DL
HGB BLD-MCNC: 12.2 G/DL (ref 13.3–17.7)
HGB UR QL STRIP: NEGATIVE
IRON SATN MFR SERPL: 7 % (ref 15–46)
IRON SERPL-MCNC: 33 UG/DL (ref 35–180)
KETONES UR STRIP-MCNC: NEGATIVE MG/DL
LDLC SERPL CALC-MCNC: 75 MG/DL
LEUKOCYTE ESTERASE UR QL STRIP: NEGATIVE
MCH RBC QN AUTO: 25.1 PG (ref 26.5–33)
MCHC RBC AUTO-ENTMCNC: 31 G/DL (ref 31.5–36.5)
MCV RBC AUTO: 81 FL (ref 78–100)
NITRATE UR QL: NEGATIVE
NONHDLC SERPL-MCNC: 104 MG/DL
PH UR STRIP: 6 [PH] (ref 5–7)
PLATELET # BLD AUTO: 284 10E3/UL (ref 150–450)
POTASSIUM BLD-SCNC: 4.1 MMOL/L (ref 3.4–5.3)
RBC # BLD AUTO: 4.87 10E6/UL (ref 4.4–5.9)
RBC URINE: 0 /HPF
SODIUM SERPL-SCNC: 139 MMOL/L (ref 133–144)
SP GR UR STRIP: <=1.005 (ref 1–1.03)
TIBC SERPL-MCNC: 490 UG/DL (ref 240–430)
TRIGL SERPL-MCNC: 144 MG/DL
UROBILINOGEN UR STRIP-MCNC: NORMAL MG/DL
WBC # BLD AUTO: 5.4 10E3/UL (ref 4–11)
WBC URINE: 0 /HPF

## 2022-07-19 PROCEDURE — 80048 BASIC METABOLIC PNL TOTAL CA: CPT | Performed by: FAMILY MEDICINE

## 2022-07-19 PROCEDURE — 86803 HEPATITIS C AB TEST: CPT | Performed by: FAMILY MEDICINE

## 2022-07-19 PROCEDURE — 83550 IRON BINDING TEST: CPT | Performed by: FAMILY MEDICINE

## 2022-07-19 PROCEDURE — 36415 COLL VENOUS BLD VENIPUNCTURE: CPT | Performed by: FAMILY MEDICINE

## 2022-07-19 PROCEDURE — 82306 VITAMIN D 25 HYDROXY: CPT | Performed by: FAMILY MEDICINE

## 2022-07-19 PROCEDURE — 83036 HEMOGLOBIN GLYCOSYLATED A1C: CPT | Performed by: FAMILY MEDICINE

## 2022-07-19 PROCEDURE — 85027 COMPLETE CBC AUTOMATED: CPT | Performed by: FAMILY MEDICINE

## 2022-07-19 PROCEDURE — 99214 OFFICE O/P EST MOD 30 MIN: CPT | Performed by: FAMILY MEDICINE

## 2022-07-19 PROCEDURE — 82728 ASSAY OF FERRITIN: CPT | Performed by: FAMILY MEDICINE

## 2022-07-19 PROCEDURE — 80061 LIPID PANEL: CPT | Performed by: FAMILY MEDICINE

## 2022-07-19 PROCEDURE — 81001 URINALYSIS AUTO W/SCOPE: CPT | Performed by: FAMILY MEDICINE

## 2022-07-19 PROCEDURE — 82607 VITAMIN B-12: CPT | Performed by: FAMILY MEDICINE

## 2022-07-19 PROCEDURE — 87389 HIV-1 AG W/HIV-1&-2 AB AG IA: CPT | Performed by: FAMILY MEDICINE

## 2022-07-19 ASSESSMENT — PATIENT HEALTH QUESTIONNAIRE - PHQ9
10. IF YOU CHECKED OFF ANY PROBLEMS, HOW DIFFICULT HAVE THESE PROBLEMS MADE IT FOR YOU TO DO YOUR WORK, TAKE CARE OF THINGS AT HOME, OR GET ALONG WITH OTHER PEOPLE: NOT DIFFICULT AT ALL
SUM OF ALL RESPONSES TO PHQ QUESTIONS 1-9: 1
SUM OF ALL RESPONSES TO PHQ QUESTIONS 1-9: 1

## 2022-07-19 ASSESSMENT — PAIN SCALES - GENERAL: PAINLEVEL: NO PAIN (0)

## 2022-07-19 NOTE — PROGRESS NOTES
Assessment & Plan       ICD-10-CM    1. Gastric bypass status for obesity  Z98.84 Vitamin B12     Vitamin D Deficiency     Lipid panel reflex to direct LDL Fasting     Hemoglobin A1c     Hemoglobin A1c     Lipid panel reflex to direct LDL Fasting     Vitamin D Deficiency     Vitamin B12     Iron and iron binding capacity     Ferritin   2. Left flank pain  R10.9 Basic metabolic panel  (Ca, Cl, CO2, Creat, Gluc, K, Na, BUN)     CBC with platelets     UA Macro with Reflex to Micro and Culture - lab collect     CT Abdomen Pelvis w Contrast     UA Macro with Reflex to Micro and Culture - lab collect     CBC with platelets     Basic metabolic panel  (Ca, Cl, CO2, Creat, Gluc, K, Na, BUN)   3. Routine general medical examination at a health care facility  Z00.00 Hepatitis C antibody     HIV Antigen Antibody Combo      History of gastric bypass  Left flank pain, sounds consistent with his past nephrolithiasis.  Will pursue a CT  Abdominal discomfort, fullness, bloating.  Could be constipation related.  Also will be evaluated by CT scan.      No follow-ups on file.    Hakeem Rebolledo MD  [facility] HospitalANIL Lozano is a 62 year old male who presents to clinic today for the following health issues     HPI     Answers for HPI/ROS submitted by the patient on 7/19/2022  If you checked off any problems, how difficult have these problems made it for you to do your work, take care of things at home, or get along with other people?: Not difficult at all  PHQ9 TOTAL SCORE: 1  How many servings of fruits and vegetables do you eat daily?: 2-3  On average, how many sweetened beverages do you drink each day (Examples: soda, juice, sweet tea, etc.  Do NOT count diet or artificially sweetened beverages)?: 0  How many minutes a day do you exercise enough to make your heart beat faster?: 20 to 29  How many days a week do you exercise enough to make your heart beat faster?: 5  How many days per week do you  miss taking your medication?: 0  What is the reason for your visit today?: Stomach issues and kidney discomfort  When did your symptoms begin?: 3-4 weeks ago  How would you describe these symptoms?: Moderate  Are your symptoms:: Staying the same  Have you had these symptoms before?: Yes  Have you tried or received treatment for these symptoms before?: Yes  Did that treatment work? : Yes  Please describe the treatment you had:: Kidney stone blast  Is there anything that makes you feel worse?: No  Is there anything that makes you feel better?: Drinking lots of water      Comes to clinic with a month of left flank pain  Seems to be improving on its own  Reminds him of kidney stone pain  No blood in his urine, or fever  Also complaining of upper abdomen bloating, occasional tenderness, hard stools      Review of Systems   Constitutional, HEENT, cardiovascular, pulmonary, gi and gu systems are negative, except as otherwise noted.      Objective    /76 (BP Location: Right arm, Patient Position: Sitting, Cuff Size: Adult Large)   Pulse 86   Temp 97.4  F (36.3  C) (Temporal)   Resp 16   Wt 117.5 kg (259 lb)   SpO2 92%   BMI 38.25 kg/m       Physical Exam   Well-appearing.  Heart regular rate lungs clear unlabored.  Obese abdomen.  Soft.  Mildly tender without rigidity or guarding.  Diffusely.  He has back is normal alignment.  No bony tenderness.  No left-sided flank tenderness.

## 2022-07-20 ENCOUNTER — HOSPITAL ENCOUNTER (OUTPATIENT)
Dept: CT IMAGING | Facility: CLINIC | Age: 62
Discharge: HOME | End: 2022-07-20
Attending: FAMILY MEDICINE | Admitting: FAMILY MEDICINE
Payer: COMMERCIAL

## 2022-07-20 DIAGNOSIS — R10.9 LEFT FLANK PAIN: ICD-10-CM

## 2022-07-20 LAB
DEPRECATED CALCIDIOL+CALCIFEROL SERPL-MC: 30 UG/L (ref 20–75)
HCV AB SERPL QL IA: NONREACTIVE
HIV 1+2 AB+HIV1 P24 AG SERPL QL IA: NONREACTIVE
VIT B12 SERPL-MCNC: 488 PG/ML (ref 232–1245)

## 2022-07-20 PROCEDURE — 74176 CT ABD & PELVIS W/O CONTRAST: CPT

## 2022-08-09 ENCOUNTER — OFFICE VISIT (OUTPATIENT)
Dept: FAMILY MEDICINE | Facility: CLINIC | Age: 62
End: 2022-08-09
Payer: COMMERCIAL

## 2022-08-09 VITALS
HEIGHT: 70 IN | WEIGHT: 259.38 LBS | TEMPERATURE: 97.6 F | SYSTOLIC BLOOD PRESSURE: 128 MMHG | OXYGEN SATURATION: 98 % | DIASTOLIC BLOOD PRESSURE: 70 MMHG | BODY MASS INDEX: 37.13 KG/M2 | HEART RATE: 68 BPM

## 2022-08-09 DIAGNOSIS — D64.9 ANEMIA, UNSPECIFIED TYPE: ICD-10-CM

## 2022-08-09 DIAGNOSIS — Z00.00 ANNUAL PHYSICAL EXAM: Primary | ICD-10-CM

## 2022-08-09 DIAGNOSIS — Z98.84 GASTRIC BYPASS STATUS FOR OBESITY: ICD-10-CM

## 2022-08-09 DIAGNOSIS — K21.9 GASTRIC REFLUX: ICD-10-CM

## 2022-08-09 PROCEDURE — 99396 PREV VISIT EST AGE 40-64: CPT | Performed by: FAMILY MEDICINE

## 2022-08-09 PROCEDURE — 99213 OFFICE O/P EST LOW 20 MIN: CPT | Mod: 25 | Performed by: FAMILY MEDICINE

## 2022-08-09 ASSESSMENT — ENCOUNTER SYMPTOMS
DYSURIA: 0
PALPITATIONS: 0
EYE PAIN: 0
FEVER: 0
NERVOUS/ANXIOUS: 0
SORE THROAT: 0
DIZZINESS: 0
MYALGIAS: 0
WEAKNESS: 0
SHORTNESS OF BREATH: 0
CHILLS: 0
HEMATURIA: 0
DIARRHEA: 0
JOINT SWELLING: 0
COUGH: 0
ARTHRALGIAS: 1
CONSTIPATION: 1
FREQUENCY: 0
HEMATOCHEZIA: 0
HEARTBURN: 1
ABDOMINAL PAIN: 1
HEADACHES: 0
NAUSEA: 0

## 2022-08-09 ASSESSMENT — PAIN SCALES - GENERAL: PAINLEVEL: NO PAIN (0)

## 2022-08-09 NOTE — PROGRESS NOTES
SUBJECTIVE:   CC: Blake Gibbs is an 62 year old male who presents for preventative health visit.       Patient has been advised of split billing requirements and indicates understanding: Yes  Healthy Habits:     Getting at least 3 servings of Calcium per day:  Yes    Bi-annual eye exam:  Yes    Dental care twice a year:  Yes    Sleep apnea or symptoms of sleep apnea:  None    Diet:  Regular (no restrictions)    Frequency of exercise:  4-5 days/week    Duration of exercise:  30-45 minutes    Taking medications regularly:  Yes    Medication side effects:  None    PHQ-2 Total Score: 0    Additional concerns today:  No      Doing well.  Reviewed his iron deficiency and gastric bypass status.  Has been thoroughly evaluated upper and lower scopes in a few years ago.  All found to be normal.  Likely this is an absorption issue.  Admits he rarely takes this supplements.  Misses his B12 as well.    Today's PHQ-2 Score:   PHQ-2 ( 1999 Pfizer) 8/9/2022   Q1: Little interest or pleasure in doing things 0   Q2: Feeling down, depressed or hopeless 0   PHQ-2 Score 0   PHQ-2 Total Score (12-17 Years)- Positive if 3 or more points; Administer PHQ-A if positive -   Q1: Little interest or pleasure in doing things Not at all   Q2: Feeling down, depressed or hopeless Not at all   PHQ-2 Score 0       Abuse: Current or Past(Physical, Sexual or Emotional)- No  Do you feel safe in your environment? Yes        Social History     Tobacco Use     Smoking status: Former Smoker     Years: 24.00     Smokeless tobacco: Never Used     Tobacco comment: quit at 18yrs old   Substance Use Topics     Alcohol use: No     Alcohol/week: 0.0 standard drinks         Alcohol Use 8/9/2022   Prescreen: >3 drinks/day or >7 drinks/week? Not Applicable   Prescreen: >3 drinks/day or >7 drinks/week? -       Last PSA:   PSA   Date Value Ref Range Status   07/22/2009 0.43 0 - 4 ug/L Final       Reviewed orders with patient. Reviewed health maintenance and updated  "orders accordingly -       Reviewed and updated as needed this visit by clinical staff   Tobacco  Allergies  Meds   Med Hx  Surg Hx  Fam Hx  Soc Hx          Reviewed and updated as needed this visit by Provider                       Review of Systems   Constitutional: Negative for chills and fever.   HENT: Negative for congestion, ear pain, hearing loss and sore throat.    Eyes: Negative for pain and visual disturbance.   Respiratory: Negative for cough and shortness of breath.    Cardiovascular: Negative for chest pain, palpitations and peripheral edema.   Gastrointestinal: Positive for abdominal pain, constipation and heartburn. Negative for diarrhea, hematochezia and nausea.   Genitourinary: Negative for dysuria, frequency, genital sores, hematuria, impotence, penile discharge and urgency.   Musculoskeletal: Positive for arthralgias. Negative for joint swelling and myalgias.   Skin: Negative for rash.   Neurological: Negative for dizziness, weakness and headaches.   Psychiatric/Behavioral: Negative for mood changes. The patient is not nervous/anxious.          OBJECTIVE:   /70   Pulse 68   Temp 97.6  F (36.4  C) (Temporal)   Ht 1.765 m (5' 9.5\")   Wt 117.7 kg (259 lb 6 oz)   SpO2 98%   BMI 37.75 kg/m      Physical Exam  GENERAL: healthy, alert and no distress  EYES: Eyes grossly normal to inspection, PERRL and conjunctivae and sclerae normal  NECK: no adenopathy, no asymmetry, masses, or scars and thyroid normal to palpation  RESP: lungs clear to auscultation - no rales, rhonchi or wheezes  CV: regular rate and rhythm, normal S1 S2, no S3 or S4, no murmur, click or rub, no peripheral edema and peripheral pulses strong  ABDOMEN: soft, nontender, no hepatosplenomegaly, no masses and bowel sounds normal  MS: no gross musculoskeletal defects noted, no edema  SKIN: no suspicious lesions or rashes    Diagnostic Test Results:  Labs reviewed in Epic    ASSESSMENT/PLAN:       ICD-10-CM    1. Annual " "physical exam  Z00.00    2. Gastric bypass status for obesity  Z98.84    3. Anemia, unspecified type  D64.9    4. Gastric reflux  K21.9      Annual topics discussed  Anemia related to iron deficiency, related to gastric bypass.  Supplement with iron 325 twice weekly, recheck in 3 to 4 months  Reflux coming under better control with diet changes and doubling up PPI.  Should reduce in the coming weeks.        COUNSELING:       Estimated body mass index is 37.75 kg/m  as calculated from the following:    Height as of this encounter: 1.765 m (5' 9.5\").    Weight as of this encounter: 117.7 kg (259 lb 6 oz).         He reports that he has quit smoking. He quit after 24.00 years of use. He has never used smokeless tobacco.      Counseling Resources:  ATP IV Guidelines  Pooled Cohorts Equation Calculator  FRAX Risk Assessment  ICSI Preventive Guidelines  Dietary Guidelines for Americans, 2010  USDA's MyPlate  ASA Prophylaxis  Lung CA Screening    Hakeem Rebolledo MD  [facility]  "

## 2022-10-09 ENCOUNTER — HEALTH MAINTENANCE LETTER (OUTPATIENT)
Age: 62
End: 2022-10-09

## 2023-02-21 ENCOUNTER — E-VISIT (OUTPATIENT)
Dept: FAMILY MEDICINE | Facility: CLINIC | Age: 63
End: 2023-02-21

## 2023-02-21 ENCOUNTER — MYC MEDICAL ADVICE (OUTPATIENT)
Dept: FAMILY MEDICINE | Facility: CLINIC | Age: 63
End: 2023-02-21
Payer: COMMERCIAL

## 2023-02-21 ENCOUNTER — TELEPHONE (OUTPATIENT)
Dept: FAMILY MEDICINE | Facility: CLINIC | Age: 63
End: 2023-02-21

## 2023-02-21 ENCOUNTER — APPOINTMENT (OUTPATIENT)
Dept: LAB | Facility: CLINIC | Age: 63
End: 2023-02-21
Payer: COMMERCIAL

## 2023-02-21 DIAGNOSIS — J02.0 STREP THROAT: Primary | ICD-10-CM

## 2023-02-21 DIAGNOSIS — J02.0 STREPTOCOCCAL SORE THROAT: Primary | ICD-10-CM

## 2023-02-21 DIAGNOSIS — J02.9 SORE THROAT: Primary | ICD-10-CM

## 2023-02-21 LAB — DEPRECATED S PYO AG THROAT QL EIA: POSITIVE

## 2023-02-21 PROCEDURE — 87880 STREP A ASSAY W/OPTIC: CPT | Performed by: FAMILY MEDICINE

## 2023-02-21 PROCEDURE — 99421 OL DIG E/M SVC 5-10 MIN: CPT | Performed by: FAMILY MEDICINE

## 2023-02-21 RX ORDER — PENICILLIN V POTASSIUM 500 MG/1
500 TABLET, FILM COATED ORAL 2 TIMES DAILY
Qty: 20 TABLET | Refills: 0 | Status: SHIPPED | OUTPATIENT
Start: 2023-02-21 | End: 2023-03-03

## 2023-02-21 NOTE — TELEPHONE ENCOUNTER
Received verbal order from Dr. Kaiser to order strep test for patient. Orders placed and patient updated via [employer].    IRWIN MaeN, RN

## 2023-02-21 NOTE — TELEPHONE ENCOUNTER
Received VORB from Dr. Kaiser for Penicillin Veetid 500mg bid [id number]. Sent to pharmacy.    Deborah Delaney,IRWINN, RN

## 2023-02-21 NOTE — TELEPHONE ENCOUNTER
Patient calling stating he saw his strep tet results on MyChart were positive. Please send any prescriptions to [facility] in [place]. Thank you.    IRWIN MaeN, RN

## 2023-02-24 RX ORDER — AMOXICILLIN 500 MG/1
1000 CAPSULE ORAL 2 TIMES DAILY
Qty: 40 CAPSULE | Refills: 0 | Status: SHIPPED | OUTPATIENT
Start: 2023-02-24 | End: 2023-03-06

## 2023-02-25 NOTE — PATIENT INSTRUCTIONS
Thank you for choosing us for your care. Given your symptoms, I would like you to do a lab-only visit to determine what is causing them.  I have placed the orders.  [place] or call [phone].  I will let you know when the results are back and next steps to take.

## 2023-04-10 ENCOUNTER — MYC MEDICAL ADVICE (OUTPATIENT)
Dept: FAMILY MEDICINE | Facility: CLINIC | Age: 63
End: 2023-04-10
Payer: COMMERCIAL

## 2023-04-11 NOTE — TELEPHONE ENCOUNTER
patient states his fever broke yesterday. He still has a sore throat, but he is feeling much better.    He still has blisters on the back of his tongue.    Please advise if patient should get a strep test?    Gertrudis Santamaria RN on 4/11/2023 at 8:29 AM

## 2023-06-06 ENCOUNTER — MYC MEDICAL ADVICE (OUTPATIENT)
Dept: FAMILY MEDICINE | Facility: CLINIC | Age: 63
End: 2023-06-06
Payer: COMMERCIAL

## 2023-06-07 NOTE — TELEPHONE ENCOUNTER
Patient would like a referral to see a vein specialist for varicose veins.     Deborah Delaney,IRWINN, RN

## 2023-07-10 ENCOUNTER — PATIENT OUTREACH (OUTPATIENT)
Dept: CARE COORDINATION | Facility: CLINIC | Age: 63
End: 2023-07-10
Payer: COMMERCIAL

## 2023-07-24 ENCOUNTER — PATIENT OUTREACH (OUTPATIENT)
Dept: CARE COORDINATION | Facility: CLINIC | Age: 63
End: 2023-07-24
Payer: COMMERCIAL

## 2023-08-03 ENCOUNTER — OFFICE VISIT (OUTPATIENT)
Dept: FAMILY MEDICINE | Facility: CLINIC | Age: 63
End: 2023-08-03
Payer: COMMERCIAL

## 2023-08-03 VITALS
HEART RATE: 58 BPM | SYSTOLIC BLOOD PRESSURE: 124 MMHG | WEIGHT: 259.25 LBS | OXYGEN SATURATION: 95 % | HEIGHT: 71 IN | BODY MASS INDEX: 36.29 KG/M2 | RESPIRATION RATE: 18 BRPM | DIASTOLIC BLOOD PRESSURE: 72 MMHG | TEMPERATURE: 97.8 F

## 2023-08-03 DIAGNOSIS — Z01.818 PREOP GENERAL PHYSICAL EXAM: Primary | ICD-10-CM

## 2023-08-03 DIAGNOSIS — I83.893 VARICOSE VEINS OF BILATERAL LOWER EXTREMITIES WITH OTHER COMPLICATIONS: ICD-10-CM

## 2023-08-03 DIAGNOSIS — E66.01 MORBID OBESITY (H): ICD-10-CM

## 2023-08-03 DIAGNOSIS — M17.12 PRIMARY OSTEOARTHRITIS OF LEFT KNEE: ICD-10-CM

## 2023-08-03 DIAGNOSIS — Z98.84 GASTRIC BYPASS STATUS FOR OBESITY: ICD-10-CM

## 2023-08-03 LAB
ANION GAP SERPL CALCULATED.3IONS-SCNC: 9 MMOL/L (ref 7–15)
BUN SERPL-MCNC: 14.1 MG/DL (ref 8–23)
CALCIUM SERPL-MCNC: 8.9 MG/DL (ref 8.8–10.2)
CHLORIDE SERPL-SCNC: 105 MMOL/L (ref 98–107)
CHOLEST SERPL-MCNC: 159 MG/DL
CREAT SERPL-MCNC: 0.89 MG/DL (ref 0.67–1.17)
DEPRECATED HCO3 PLAS-SCNC: 24 MMOL/L (ref 22–29)
ERYTHROCYTE [DISTWIDTH] IN BLOOD BY AUTOMATED COUNT: 15.9 % (ref 10–15)
FOLATE SERPL-MCNC: 8 NG/ML (ref 4.6–34.8)
GFR SERPL CREATININE-BSD FRML MDRD: >90 ML/MIN/1.73M2
GLUCOSE SERPL-MCNC: 90 MG/DL (ref 70–99)
HBA1C MFR BLD: 5.4 %
HCT VFR BLD AUTO: 36.4 % (ref 40–53)
HDLC SERPL-MCNC: 50 MG/DL
HGB BLD-MCNC: 11.2 G/DL (ref 13.3–17.7)
LDLC SERPL CALC-MCNC: 92 MG/DL
MCH RBC QN AUTO: 23.7 PG (ref 26.5–33)
MCHC RBC AUTO-ENTMCNC: 30.8 G/DL (ref 31.5–36.5)
MCV RBC AUTO: 77 FL (ref 78–100)
NONHDLC SERPL-MCNC: 109 MG/DL
PLATELET # BLD AUTO: 266 10E3/UL (ref 150–450)
POTASSIUM SERPL-SCNC: 4.3 MMOL/L (ref 3.4–5.3)
RBC # BLD AUTO: 4.72 10E6/UL (ref 4.4–5.9)
SODIUM SERPL-SCNC: 138 MMOL/L (ref 136–145)
TRIGL SERPL-MCNC: 84 MG/DL
VIT B12 SERPL-MCNC: 577 PG/ML (ref 232–1245)
WBC # BLD AUTO: 5.8 10E3/UL (ref 4–11)

## 2023-08-03 PROCEDURE — 36415 COLL VENOUS BLD VENIPUNCTURE: CPT | Performed by: FAMILY MEDICINE

## 2023-08-03 PROCEDURE — 80048 BASIC METABOLIC PNL TOTAL CA: CPT | Performed by: FAMILY MEDICINE

## 2023-08-03 PROCEDURE — 85027 COMPLETE CBC AUTOMATED: CPT | Performed by: FAMILY MEDICINE

## 2023-08-03 PROCEDURE — 80061 LIPID PANEL: CPT | Performed by: FAMILY MEDICINE

## 2023-08-03 PROCEDURE — 82746 ASSAY OF FOLIC ACID SERUM: CPT | Performed by: FAMILY MEDICINE

## 2023-08-03 PROCEDURE — 99214 OFFICE O/P EST MOD 30 MIN: CPT | Performed by: FAMILY MEDICINE

## 2023-08-03 PROCEDURE — 93000 ELECTROCARDIOGRAM COMPLETE: CPT | Performed by: FAMILY MEDICINE

## 2023-08-03 PROCEDURE — 82607 VITAMIN B-12: CPT | Performed by: FAMILY MEDICINE

## 2023-08-03 PROCEDURE — 83036 HEMOGLOBIN GLYCOSYLATED A1C: CPT | Performed by: FAMILY MEDICINE

## 2023-08-03 PROCEDURE — 82306 VITAMIN D 25 HYDROXY: CPT | Performed by: FAMILY MEDICINE

## 2023-08-03 NOTE — PROGRESS NOTES
[address] [address]  Phone: [phone]  Fax: [phone]  Primary Provider: Yahir Rebolledo  Pre-op Performing Provider: YAHIR REBOLLEDO      PREOPERATIVE EVALUATION:  Today's date: 8/3/2023    Blake Gibbs is a 63 year old male who presents for a preoperative evaluation.      8/3/2023     9:34 AM   Additional Questions   Roomed by Jessica FRANCO MA       Surgical Information:  Surgery/Procedure: knee replacement left  Surgery Location: [facility]  Surgeon:   Surgery Date: 8/16/23  Time of Surgery: unknown  Where patient plans to recover: At home with family  Fax number for surgical facility:     Assessment & Plan     The proposed surgical procedure is considered INTERMEDIATE risk.      ICD-10-CM    1. Preop general physical exam  Z01.818 EKG 12-lead complete w/read - Clinics      2. Varicose veins of bilateral lower extremities with other complications  I83.893 Vascular Surgery Referral      3. Gastric bypass status for obesity  Z98.84 Vitamin D Deficiency     Vitamin B12     Folate     CBC with platelets     Hemoglobin A1c     Lipid panel reflex to direct LDL Fasting     Basic metabolic panel  (Ca, Cl, CO2, Creat, Gluc, K, Na, BUN)     Vitamin D Deficiency     Vitamin B12     Folate     CBC with platelets     Hemoglobin A1c     Lipid panel reflex to direct LDL Fasting     Basic metabolic panel  (Ca, Cl, CO2, Creat, Gluc, K, Na, BUN)      4. Morbid obesity (H)  E66.01       5. Primary osteoarthritis of left knee  M17.12            History of bypass, check labs today  Historically mildly low iron deficient anemia.  This is from poor absorption status post bypass.  Continue with supplementation.  Does not interfere with surgery at this point.     - No identified additional risk factors other than previously addressed        RECOMMENDATION:  APPROVAL GIVEN to proceed with proposed procedure, without further diagnostic  evaluation.        Subjective       HPI related to upcoming procedure:     Having knee done  Feels well overall  Can get up a single flight of stairs without dyspnea. Estimated METS > 4.            7/28/2023     7:50 AM   Preop Questions   1. Have you ever had a heart attack or stroke? No   2. Have you ever had surgery on your heart or blood vessels, such as a stent placement, a coronary artery bypass, or surgery on an artery in your head, neck, heart, or legs? No   3. Do you have chest pain with activity? No   4. Do you have a history of  heart failure? No   5. Do you currently have a cold, bronchitis or symptoms of other infection? No   6. Do you have a cough, shortness of breath, or wheezing? No   7. Do you or anyone in your family have previous history of blood clots? No   8. Do you or does anyone in your family have a serious bleeding problem such as prolonged bleeding following surgeries or cuts? No   9. Have you ever had problems with anemia or been told to take iron pills? YES -    10. Have you had any abnormal blood loss such as black, tarry or bloody stools? No   11. Have you ever had a blood transfusion? No   12. Are you willing to have a blood transfusion if it is medically needed before, during, or after your surgery? Yes   13. Have you or any of your relatives ever had problems with anesthesia? No   14. Do you have sleep apnea, excessive snoring or daytime drowsiness? No   15. Do you have any artifical heart valves or other implanted medical devices like a pacemaker, defibrillator, or continuous glucose monitor? No   16. Do you have artificial joints? No   17. Are you allergic to latex? No       Health Care Directive:  Patient does not have a Health Care Directive or Living Will:     Preoperative Review of :            Review of Systems  CONSTITUTIONAL: NEGATIVE for fever, chills, change in weight  ENT/MOUTH: NEGATIVE for ear, mouth and throat problems  RESP: NEGATIVE for significant cough or  SOB  CV: NEGATIVE for chest pain, palpitations or peripheral edema    Patient Active Problem List    Diagnosis Date Noted    Obesity (BMI 35.0-39.9) with comorbidity (H) 09/12/2018     Priority: Medium    Anemia-inadequate absorption 09/12/2018     Priority: Medium    CARDIOVASCULAR SCREENING; LDL GOAL LESS THAN 160 10/31/2010     Priority: Medium    Gastric bypass status for obesity 03/18/2010     Priority: Medium    DJD (Degenerative Joint Disease)Bilateral knees 03/18/2010     Priority: Medium    Renal calculus 12/17/2008     Priority: Medium    Obesity 02/03/2003     Priority: Medium     Problem list name updated by automated process. Provider to review      Allergic rhinitis due to pollen 09/12/2002     Priority: Medium    Varicose veins of legs 03/18/2010     Priority: Low     Patient has had one prior vein stripping        Past Medical History:   Diagnosis Date    Allergic rhinitis, cause unspecified     Allergic rhinitis    Calculus of ureter 01/16/09    discharged 01/17/09    Hydronephrosis     Obesity, unspecified     Obesity     Past Surgical History:   Procedure Laterality Date    COLONOSCOPY  04/13/10    COLONOSCOPY N/A 7/2/2020    Procedure: COLONOSCOPY;  Surgeon: Gio Lackey, ;  Location:  GI    CYSTOSCOPY,+URETEROSCOPY  01/14/09-    [place]    CYSTOSCOPY,DIL URETHRAL STRICTURE  1991,1998    ESOPHAGOSCOPY, GASTROSCOPY, DUODENOSCOPY (EGD), COMBINED N/A 7/2/2020    Procedure: Esophagogastroduodenoscopy with biopsy,;  Surgeon: Gio Lackey DO;  Location:  GI    GASTRIC BYPASS  2003    HC LIGATE & DIVISION LONG SAPH VEIN SEP-FEM JUNC, DISTAL INTERUPT  11/29/2004     Left leg    HC VASECTOMY UNILAT/BILAT W POSTOP SEMEN      Partial bilateral vasectomy    ZZC NONSPECIFIC PROCEDURE  1963    Double hernia    ZZC REMV STOMACH,PART,DISTAL,SINCERE-EN-Y       Current Outpatient Medications   Medication Sig Dispense Refill    Cyanocobalamin (VITAMIN  B-12) 2500 MCG tablet Place 2,500  "mcg under the tongue daily      esomeprazole (NEXIUM) 20 MG DR capsule       Saccharomyces boulardii (PROBIOTIC) 250 MG CAPS       Glucos-Chondroit-Collag-Hyal (GLUCOSAMINE CHONDROIT-COLLAGEN) CAPS  (Patient not taking: Reported on 7/19/2022)      Multiple Vitamin (MULTIVITAMINS PO) Take  by mouth. (Patient not taking: Reported on 7/19/2022)         Allergies   Allergen Reactions    No Known Drug Allergy         Social History     Tobacco Use    Smoking status: Former     Years: 24.00     Types: Cigarettes    Smokeless tobacco: Never    Tobacco comments:     quit at 18yrs old   Substance Use Topics    Alcohol use: No     Alcohol/week: 0.0 standard drinks of alcohol       History   Drug Use No         Objective     /72   Pulse 58   Temp 97.8  F (36.6  C) (Temporal)   Resp 18   Ht 1.795 m (5' 10.67\")   Wt 117.6 kg (259 lb 4 oz)   SpO2 95%   BMI 36.50 kg/m      Physical Exam  GENERAL APPEARANCE: healthy, alert and no distress  HENT: ear canals and TM's normal and nose and mouth without ulcers or lesions  RESP: lungs clear to auscultation - no rales, rhonchi or wheezes  CV: regular rate and rhythm, normal S1 S2, no S3 or S4 and no murmur, click or rub   ABDOMEN: soft, nontender, no HSM or masses and bowel sounds normal  NEURO: Normal strength and tone, sensory exam grossly normal, mentation intact and speech normal    Recent Labs   Lab Test 07/19/22  1239   HGB 12.2*         POTASSIUM 4.1   CR 0.99   A1C 5.3      Results for orders placed or performed in visit on 08/03/23   Vitamin D Deficiency     Status: Normal   Result Value Ref Range    Vitamin D, Total (25-Hydroxy) 31 20 - 75 ug/L    Narrative    Season, race, dietary intake, and treatment affect the concentration of 25-hydroxy-Vitamin D. Values may decrease during winter months and increase during summer months. Values 20-29 ug/L may indicate Vitamin D insufficiency and values <20 ug/L may indicate Vitamin D deficiency.    Vitamin D " determination is routinely performed by an immunoassay specific for 25 hydroxyvitamin D3.  If an individual is on vitamin D2(ergocalciferol) supplementation, please specify 25 OH vitamin D2 and D3 level determination by LCMSMS test VITD23.     Vitamin B12     Status: Normal   Result Value Ref Range    Vitamin B12 577 232 - 1,245 pg/mL   Folate     Status: Normal   Result Value Ref Range    Folic Acid 8.0 4.6 - 34.8 ng/mL   CBC with platelets     Status: Abnormal   Result Value Ref Range    WBC Count 5.8 4.0 - 11.0 10e3/uL    RBC Count 4.72 4.40 - 5.90 10e6/uL    Hemoglobin 11.2 (L) 13.3 - 17.7 g/dL    Hematocrit 36.4 (L) 40.0 - 53.0 %    MCV 77 (L) 78 - 100 fL    MCH 23.7 (L) 26.5 - 33.0 pg    MCHC 30.8 (L) 31.5 - 36.5 g/dL    RDW 15.9 (H) 10.0 - 15.0 %    Platelet Count 266 150 - 450 10e3/uL   Hemoglobin A1c     Status: Normal   Result Value Ref Range    Hemoglobin A1C 5.4 <5.7 %   Lipid panel reflex to direct LDL Fasting     Status: Normal   Result Value Ref Range    Cholesterol 159 <200 mg/dL    Triglycerides 84 <150 mg/dL    Direct Measure HDL 50 >=40 mg/dL    LDL Cholesterol Calculated 92 <=100 mg/dL    Non HDL Cholesterol 109 <130 mg/dL    Narrative    Cholesterol  Desirable:  <200 mg/dL    Triglycerides  Normal:  Less than 150 mg/dL  Borderline High:  150-199 mg/dL  High:  200-499 mg/dL  Very High:  Greater than or equal to 500 mg/dL    Direct Measure HDL  Female:  Greater than or equal to 50 mg/dL   Male:  Greater than or equal to 40 mg/dL    LDL Cholesterol  Desirable:  <100mg/dL  Above Desirable:  100-129 mg/dL   Borderline High:  130-159 mg/dL   High:  160-189 mg/dL   Very High:  >= 190 mg/dL    Non HDL Cholesterol  Desirable:  130 mg/dL  Above Desirable:  130-159 mg/dL  Borderline High:  160-189 mg/dL  High:  190-219 mg/dL  Very High:  Greater than or equal to 220 mg/dL   Basic metabolic panel  (Ca, Cl, CO2, Creat, Gluc, K, Na, BUN)     Status: Normal   Result Value Ref Range    Sodium 138 136 - 145  mmol/L    Potassium 4.3 3.4 - 5.3 mmol/L    Chloride 105 98 - 107 mmol/L    Carbon Dioxide (CO2) 24 22 - 29 mmol/L    Anion Gap 9 7 - 15 mmol/L    Urea Nitrogen 14.1 8.0 - 23.0 mg/dL    Creatinine 0.89 0.67 - 1.17 mg/dL    Calcium 8.9 8.8 - 10.2 mg/dL    Glucose 90 70 - 99 mg/dL    GFR Estimate >90 >60 mL/min/1.73m2      Diagnostics:  Labs pending at this time.  Results will be reviewed when available.   EKG shows normal sinus rhythm with a pulse of 55, normal ST segments, no T wave inversion, normal axis    Revised Cardiac Risk Index (RCRI):  The patient has the following serious cardiovascular risks for perioperative complications:   - No serious cardiac risks = 0 points     RCRI Interpretation: 0 points: Class I (very low risk - 0.4% complication rate)         Signed Electronically by: Hakeem Rebolledo MD  Copy of this evaluation report is provided to requesting physician.

## 2023-08-04 ENCOUNTER — TELEPHONE (OUTPATIENT)
Dept: FAMILY MEDICINE | Facility: CLINIC | Age: 63
End: 2023-08-04
Payer: COMMERCIAL

## 2023-08-04 DIAGNOSIS — Z98.84 GASTRIC BYPASS STATUS FOR OBESITY: Primary | ICD-10-CM

## 2023-08-04 LAB — DEPRECATED CALCIDIOL+CALCIFEROL SERPL-MC: 31 UG/L (ref 20–75)

## 2023-08-04 NOTE — TELEPHONE ENCOUNTER
Order/Referral Request    Who is requesting: Janina braun [facility]    Orders being requested: RECHECK LABS, HEMOGLOBIN IS LOW    Reason service is needed/diagnosis: HEMOGLOBIN IS LOW    When are orders needed by:  BEFORE AUGUST 11TH.    Has this been discussed with Provider: No    Does patient have a preference on a Group/Provider/Facility? [facility]    Does patient have an appointment scheduled?: No    Where to send orders: N/A    Could we send this information to you in [place] or would you prefer to receive a phone call?:   Patient would prefer a phone call   Okay to leave a detailed message?: Yes at Cell number on file:    Telephone Information:   Mobile [phone]

## 2023-08-07 NOTE — TELEPHONE ENCOUNTER
Do they only need a recheck? Are they cancelling surgery? At what level will they proceed? He has a history of gastric bypass and runs a little low, so this is not a new problem for him. From my medical standpoint, he is clear to proceed with surgery. Can they please let his surgeon know. thanks

## 2023-08-08 NOTE — TELEPHONE ENCOUNTER
Left message for Janina at [facility] ([phone])to return my call.

## 2023-08-08 NOTE — TELEPHONE ENCOUNTER
Patient is taking his iron again and Janina at [facility] said they want to know where he is at.  Left message with Tristen to make lab appt. I will fax this to [phone] when results are done

## 2023-08-11 ENCOUNTER — OFFICE VISIT (OUTPATIENT)
Dept: VASCULAR SURGERY | Facility: CLINIC | Age: 63
End: 2023-08-11
Attending: FAMILY MEDICINE
Payer: COMMERCIAL

## 2023-08-11 ENCOUNTER — LAB (OUTPATIENT)
Dept: LAB | Facility: CLINIC | Age: 63
End: 2023-08-11
Payer: COMMERCIAL

## 2023-08-11 DIAGNOSIS — Z98.84 GASTRIC BYPASS STATUS FOR OBESITY: ICD-10-CM

## 2023-08-11 DIAGNOSIS — I83.893 VARICOSE VEINS OF BILATERAL LOWER EXTREMITIES WITH OTHER COMPLICATIONS: Primary | ICD-10-CM

## 2023-08-11 LAB — HGB BLD-MCNC: 11.4 G/DL (ref 13.3–17.7)

## 2023-08-11 PROCEDURE — 99203 OFFICE O/P NEW LOW 30 MIN: CPT | Performed by: SURGERY

## 2023-08-11 PROCEDURE — 85018 HEMOGLOBIN: CPT

## 2023-08-11 PROCEDURE — 36415 COLL VENOUS BLD VENIPUNCTURE: CPT

## 2023-08-11 NOTE — NURSING NOTE
Patient Reported symptoms:    Right leg   Heaviness Most of the time   Achiness Most of the time   Swelling Most of the time   Throbbing None of the time   Itching None of the time   Appearance Extremely noticeable   Impact on work/activities Symptoms but full able to participate    Left Leg   Heaviness Most of the time   Achiness Most of the time   Swelling Most of the time   Throbbing None of the time   Itching None of the time   Appearance Extremely noticeable   Impact on work/activities Symptoms but full able to participate

## 2023-08-11 NOTE — PROGRESS NOTES
[facility] CONSULTATION    HPI:    Blake Gibbs is a pleasant 63 year old male referred by Dr. Hakeem Rebolledo for evaluation of recurrent left lower extremity varicose veins and primary right lower extremity varicose veins with bilateral pain, swelling and superficial thrombophlebitis.  The patient is most troubled by nocturnal cramps in the right greater than left legs and by recurrent episodes of superficial thrombophlebitis, at least 3 episodes this year and the last episode only 2 months ago.  These tend to occur in the lateral aspects of his legs, bilaterally, likely associated with the fact that he sleeps on his side.    He had left leg vein stripping in approximately 2004 but does not feel that they removed the entire great saphenous vein.  He describes pain as an ache, tiredness, heaviness, and cramping.  The achy pain is worse after standing for long periods of time and toward the end of the day and improves with elevation of the leg and with compression hose.  He has worn compression hose for years.  Pain is located in his calves and in his feet and is associated with excessive fatigue and heaviness.    Symptoms interfere with his ability to sit or stand for long periods of time, causing him to have to change positions and to sometimes get up and walk for relief.  The nocturnal cramps awaken him from sleep while the recurrent episodes of phlebitis are quite painful.    He has no history of deep vein thrombosis or hemorrhage.    Family history significant for varicose veins in his maternal grandmother.  There is no family history of venous thromboembolism.    PAST MEDICAL HISTORY:   Past Medical History:   Diagnosis Date    Allergic rhinitis, cause unspecified     Allergic rhinitis    Calculus of ureter 01/16/09    discharged 01/17/09    Hydronephrosis     Obesity, unspecified     Obesity       PAST SURGICAL HISTORY:   Past Surgical History:   Procedure Laterality Date    COLONOSCOPY  04/13/10     COLONOSCOPY N/A 2020    Procedure: COLONOSCOPY;  Surgeon: Gio Lackey, DO;  Location: [facility]    CYSTOSCOPY,+URETEROSCOPY  09-    [place]    CYSTOSCOPY,DIL URETHRAL STRICTURE  ,    ESOPHAGOSCOPY, GASTROSCOPY, DUODENOSCOPY (EGD), COMBINED N/A 2020    Procedure: Esophagogastroduodenoscopy with biopsy,;  Surgeon: Gio Lackey DO;  Location: [facility]    GASTRIC BYPASS      HC LIGATE & DIVISION LONG SAPH VEIN SEP-FEM JUNC, DISTAL INTERUPT  2004     Left leg    HC VASECTOMY UNILAT/BILAT W POSTOP SEMEN      Partial bilateral vasectomy    ZZC NONSPECIFIC PROCEDURE  1963    Double hernia    ZZC REMV STOMACH,PART,DISTAL,SINCERE-EN-Y         FAMILY HISTORY:   Family History   Problem Relation Age of Onset    Gynecology Mother         Hyst    Alcohol/Drug Other         alcohol and drug    Cancer Paternal Grandfather         prostate    Heart Disease Paternal Uncle         Tacycardia--       SOCIAL HISTORY:   Social History     Tobacco Use    Smoking status: Former     Years: 24.00     Types: Cigarettes    Smokeless tobacco: Never    Tobacco comments:     quit at 18yrs old   Substance Use Topics    Alcohol use: No     Alcohol/week: 0.0 standard drinks of alcohol       REVIEW OF SYSTEMS: Review Of Systems  Skin: negative  Eyes: negative  Ears/Nose/Throat: negative  Respiratory: No shortness of breath, dyspnea on exertion, cough, or hemoptysis  Cardiovascular: negative  Gastrointestinal: Indigestion/reflux  Genitourinary: negative  Musculoskeletal: Leg pain, leg swelling  Neurologic: negative  Psychiatric: negative  Hematologic/Lymphatic/Immunologic: negative  Endocrine: negative      Vital signs:  There were no vitals taken for this visit.    Current Outpatient Medications   Medication Sig Dispense Refill    Cyanocobalamin (VITAMIN  B-12) 2500 MCG tablet Place 2,500 mcg under the tongue daily      esomeprazole (NEXIUM) 20 MG DR capsule       Saccharomyces ashiai  (PROBIOTIC) 250 MG CAPS       Glucos-Chondroit-Collag-Hyal (GLUCOSAMINE CHONDROIT-COLLAGEN) CAPS  (Patient not taking: Reported on 7/19/2022)      Multiple Vitamin (MULTIVITAMINS PO) Take  by mouth. (Patient not taking: Reported on 7/19/2022)         PHYSICAL EXAM:  General: Pleasant, NAD.   HEENT: Normocephalic, atraumatic, external ears and nose normal.   Respiratory: Normal respiratory effort.   Cardiovascular: Pulse is regular.   Musculoskeletal: Gait and station normal.  The joints of his fingers and toes without deformity.  There is no cyanosis of his nailbeds.   EXTREMITIES: Right lower extremity: 6 mm varicosities over the posteromedial thigh coursing laterally and distally across the lateral popliteal fossa and onto the posterolateral right leg.  4 to 6 mm varicosities coursing from the anteromedial right leg, laterally across the pretibial area to the lateral leg.  Firmness of the tissues of the distal leg with scattered hyperpigmentation consistent with lipodermatosclerosis.    Left lower extremity: 6 mm varicosities on the mid anterior left thigh coursing distally and laterally lateral to the left patella and down the anteromedial left thigh to the medial calf.  Varicosity from the medial calf courses laterally across the pretibial area to the lateral left leg.  Firmness consistent lipodermatosclerosis from mid leg to the ankles with scattered hyperpigmentation.  PULSES: R/L (3=normal pulse, 0=no palpable pulse) dorsalis pedis: 3/1; posterior tibial: 3/3.      Neurologic: Grossly normal  Psychiatric: Mood, affect, judgment and insight are normal     ASSESSMENT:  Recurrent left lower extremity varicose veins and primary right lower extremity varicose veins with pain, swelling and recurrent episodes of superficial thrombophlebitis.  He has worn compression hose for years but continues to have symptoms.  The symptoms are interfering with his actives of daily living.    We discussed lower extremity vein  anatomy and the pathophysiology of venous insufficiency utilizing a leg vein drawing.  The option of continued conservative measures as he is pursued was discussed with risks of recurrent superficial phlebitis, bleeding and progression of disease process.    He wishes to pursue bilateral lower extremity venous competency studies, the results of which could be discussed via a virtual visit.  Option for treating superficial axial incompetence utilizing office-based endovenous ablation and possible phlebectomies for the large varicose veins was discussed.  Risk of the treatment including bleeding, infection, nerve injury, deep vein thrombosis and recurrent varicose veins were discussed.  He voiced understanding and his questions were answered.    PLAN:  Bilateral lower extremity venous competency study with video visit to discuss results     Blake Shannon MD FACS    Dictated using Dragon voice recognition software which may result in transcription errors        VEIN CLINIC LEG DRAWING:

## 2023-08-11 NOTE — LETTER
8/11/2023         RE: Blake Gibbs  [address] [facility] [id number]        Dear Colleague,    Thank you for referring your patient, Blake Gibbs, to the [facility] [place]. Please see a copy of my visit note below.              VE[facility]OLUTIONS CONSULTATION    HPI:    Blake Gibbs is a pleasant 63 year old male referred by Dr. Hakeem Rebolledo for evaluation of recurrent left lower extremity varicose veins and primary right lower extremity varicose veins with bilateral pain, swelling and superficial thrombophlebitis.  The patient is most troubled by nocturnal cramps in the right greater than left legs and by recurrent episodes of superficial thrombophlebitis, at least 3 episodes this year and the last episode only 2 months ago.  These tend to occur in the lateral aspects of his legs, bilaterally, likely associated with the fact that he sleeps on his side.    He had left leg vein stripping in approximately 2004 but does not feel that they removed the entire great saphenous vein.  He describes pain as an ache, tiredness, heaviness, and cramping.  The achy pain is worse after standing for long periods of time and toward the end of the day and improves with elevation of the leg and with compression hose.  He has worn compression hose for years.  Pain is located in his calves and in his feet and is associated with excessive fatigue and heaviness.    Symptoms interfere with his ability to sit or stand for long periods of time, causing him to have to change positions and to sometimes get up and walk for relief.  The nocturnal cramps awaken him from sleep while the recurrent episodes of phlebitis are quite painful.    He has no history of deep vein thrombosis or hemorrhage.    Family history significant for varicose veins in his maternal grandmother.  There is no family history of venous thromboembolism.    PAST MEDICAL HISTORY:   Past Medical History:   Diagnosis Date      Allergic rhinitis, cause unspecified     Allergic rhinitis     Calculus of ureter 09    discharged 09     Hydronephrosis      Obesity, unspecified     Obesity       PAST SURGICAL HISTORY:   Past Surgical History:   Procedure Laterality Date     COLONOSCOPY  04/13/10     COLONOSCOPY N/A 2020    Procedure: COLONOSCOPY;  Surgeon: Gio Lackey DO;  Location:  GI     CYSTOSCOPY,+URETEROSCOPY  09-    [place]     CYSTOSCOPY,DIL URETHRAL STRICTURE  ,     ESOPHAGOSCOPY, GASTROSCOPY, DUODENOSCOPY (EGD), COMBINED N/A 2020    Procedure: Esophagogastroduodenoscopy with biopsy,;  Surgeon: Gio Lackey DO;  Location:  GI     GASTRIC BYPASS       HC LIGATE & DIVISION LONG SAPH VEIN SEP-FEM JUNC, DISTAL INTERUPT  2004     Left leg     HC VASECTOMY UNILAT/BILAT W POSTOP SEMEN      Partial bilateral vasectomy     ZZC NONSPECIFIC PROCEDURE  1963    Double hernia     ZZC REMV STOMACH,PART,DISTAL,SINCERE-EN-Y         FAMILY HISTORY:   Family History   Problem Relation Age of Onset     Gynecology Mother         Hyst     Alcohol/Drug Other         alcohol and drug     Cancer Paternal Grandfather         prostate     Heart Disease Paternal Uncle         Tacycardia--       SOCIAL HISTORY:   Social History     Tobacco Use     Smoking status: Former     Years: 24.00     Types: Cigarettes     Smokeless tobacco: Never     Tobacco comments:     quit at 18yrs old   Substance Use Topics     Alcohol use: No     Alcohol/week: 0.0 standard drinks of alcohol       REVIEW OF SYSTEMS: Review Of Systems  Skin: negative  Eyes: negative  Ears/Nose/Throat: negative  Respiratory: No shortness of breath, dyspnea on exertion, cough, or hemoptysis  Cardiovascular: negative  Gastrointestinal: Indigestion/reflux  Genitourinary: negative  Musculoskeletal: Leg pain, leg swelling  Neurologic: negative  Psychiatric: negative  Hematologic/Lymphatic/Immunologic: negative  Endocrine:  negative      Vital signs:  There were no vitals taken for this visit.    Current Outpatient Medications   Medication Sig Dispense Refill     Cyanocobalamin (VITAMIN  B-12) 2500 MCG tablet Place 2,500 mcg under the tongue daily       esomeprazole (NEXIUM) 20 MG DR capsule        Saccharomyces boulardii (PROBIOTIC) 250 MG CAPS        Glucos-Chondroit-Collag-Hyal (GLUCOSAMINE CHONDROIT-COLLAGEN) CAPS  (Patient not taking: Reported on 7/19/2022)       Multiple Vitamin (MULTIVITAMINS PO) Take  by mouth. (Patient not taking: Reported on 7/19/2022)         PHYSICAL EXAM:  General: Pleasant, NAD.   HEENT: Normocephalic, atraumatic, external ears and nose normal.   Respiratory: Normal respiratory effort.   Cardiovascular: Pulse is regular.   Musculoskeletal: Gait and station normal.  The joints of his fingers and toes without deformity.  There is no cyanosis of his nailbeds.   EXTREMITIES: Right lower extremity: 6 mm varicosities over the posteromedial thigh coursing laterally and distally across the lateral popliteal fossa and onto the posterolateral right leg.  4 to 6 mm varicosities coursing from the anteromedial right leg, laterally across the pretibial area to the lateral leg.  Firmness of the tissues of the distal leg with scattered hyperpigmentation consistent with lipodermatosclerosis.    Left lower extremity: 6 mm varicosities on the mid anterior left thigh coursing distally and laterally lateral to the left patella and down the anteromedial left thigh to the medial calf.  Varicosity from the medial calf courses laterally across the pretibial area to the lateral left leg.  Firmness consistent lipodermatosclerosis from mid leg to the ankles with scattered hyperpigmentation.  PULSES: R/L (3=normal pulse, 0=no palpable pulse) dorsalis pedis: 3/1; posterior tibial: 3/3.      Neurologic: Grossly normal  Psychiatric: Mood, affect, judgment and insight are normal     ASSESSMENT:  Recurrent left lower extremity varicose  veins and primary right lower extremity varicose veins with pain, swelling and recurrent episodes of superficial thrombophlebitis.  He has worn compression hose for years but continues to have symptoms.  The symptoms are interfering with his actives of daily living.    We discussed lower extremity vein anatomy and the pathophysiology of venous insufficiency utilizing a leg vein drawing.  The option of continued conservative measures as he is pursued was discussed with risks of recurrent superficial phlebitis, bleeding and progression of disease process.    He wishes to pursue bilateral lower extremity venous competency studies, the results of which could be discussed via a virtual visit.  Option for treating superficial axial incompetence utilizing office-based endovenous ablation and possible phlebectomies for the large varicose veins was discussed.  Risk of the treatment including bleeding, infection, nerve injury, deep vein thrombosis and recurrent varicose veins were discussed.  He voiced understanding and his questions were answered.    PLAN:  Bilateral lower extremity venous competency study with video visit to discuss results     Blake Shannon MD FACS    Dictated using Dragon voice recognition software which may result in transcription errors        [facility] LEG DRAWING:              Again, thank you for allowing me to participate in the care of your patient.        Sincerely,        Blake Shannon MD

## 2023-08-16 ENCOUNTER — TRANSFERRED RECORDS (OUTPATIENT)
Dept: HEALTH INFORMATION MANAGEMENT | Facility: CLINIC | Age: 63
End: 2023-08-16
Payer: COMMERCIAL

## 2023-10-28 ENCOUNTER — HEALTH MAINTENANCE LETTER (OUTPATIENT)
Age: 63
End: 2023-10-28

## 2023-11-28 ENCOUNTER — OFFICE VISIT (OUTPATIENT)
Dept: FAMILY MEDICINE | Facility: CLINIC | Age: 63
End: 2023-11-28
Payer: COMMERCIAL

## 2023-11-28 VITALS
HEART RATE: 62 BPM | BODY MASS INDEX: 35.07 KG/M2 | SYSTOLIC BLOOD PRESSURE: 130 MMHG | TEMPERATURE: 97.6 F | OXYGEN SATURATION: 96 % | HEIGHT: 70 IN | DIASTOLIC BLOOD PRESSURE: 78 MMHG | WEIGHT: 245 LBS

## 2023-11-28 DIAGNOSIS — Z98.84 GASTRIC BYPASS STATUS FOR OBESITY: ICD-10-CM

## 2023-11-28 DIAGNOSIS — D50.8 IRON DEFICIENCY ANEMIA SECONDARY TO INADEQUATE DIETARY IRON INTAKE: ICD-10-CM

## 2023-11-28 DIAGNOSIS — Z00.00 ANNUAL PHYSICAL EXAM: Primary | ICD-10-CM

## 2023-11-28 PROCEDURE — 99396 PREV VISIT EST AGE 40-64: CPT | Mod: 25 | Performed by: FAMILY MEDICINE

## 2023-11-28 PROCEDURE — 90686 IIV4 VACC NO PRSV 0.5 ML IM: CPT | Performed by: FAMILY MEDICINE

## 2023-11-28 PROCEDURE — 90471 IMMUNIZATION ADMIN: CPT | Performed by: FAMILY MEDICINE

## 2023-11-28 ASSESSMENT — ENCOUNTER SYMPTOMS
SORE THROAT: 0
EYE PAIN: 0
PARESTHESIAS: 0
WEAKNESS: 0
COUGH: 0
DIARRHEA: 0
HEARTBURN: 0
DYSURIA: 0
FREQUENCY: 0
NAUSEA: 0
JOINT SWELLING: 0
CHILLS: 0
ABDOMINAL PAIN: 0
PALPITATIONS: 0
FEVER: 0
SHORTNESS OF BREATH: 0
DIZZINESS: 0
NERVOUS/ANXIOUS: 0
HEMATURIA: 0
HEADACHES: 0
MYALGIAS: 0
HEMATOCHEZIA: 0
ARTHRALGIAS: 0
CONSTIPATION: 0

## 2023-11-28 ASSESSMENT — PAIN SCALES - GENERAL: PAINLEVEL: NO PAIN (0)

## 2023-11-28 NOTE — PROGRESS NOTES
SUBJECTIVE:   Tristen is a 63 year old, presenting for the following:  Physical        11/28/2023    11:32 AM   Additional Questions   Roomed by Nadira VAIL       Healthy Habits:     Getting at least 3 servings of Calcium per day:  Yes    Bi-annual eye exam:  Yes    Dental care twice a year:  Yes    Sleep apnea or symptoms of sleep apnea:  None    Diet:  Regular (no restrictions)    Frequency of exercise:  4-5 days/week    Duration of exercise:  30-45 minutes    Taking medications regularly:  Yes    Medication side effects:  None    Additional concerns today:  No          Social History     Tobacco Use    Smoking status: Former     Years: 24     Types: Cigarettes    Smokeless tobacco: Never    Tobacco comments:     quit at 18yrs old   Substance Use Topics    Alcohol use: No     Alcohol/week: 0.0 standard drinks of alcohol             11/28/2023    11:09 AM   Alcohol Use   Prescreen: >3 drinks/day or >7 drinks/week? Not Applicable       Last PSA:   PSA   Date Value Ref Range Status   07/22/2009 0.43 0 - 4 ug/L Final       Reviewed orders with patient. Reviewed health maintenance and updated orders accordingly -       Reviewed and updated as needed this visit by clinical staff   Tobacco  Allergies  Meds              Reviewed and updated as needed this visit by Provider                     Review of Systems   Constitutional:  Negative for chills and fever.   HENT:  Negative for congestion, ear pain, hearing loss and sore throat.    Eyes:  Negative for pain and visual disturbance.   Respiratory:  Negative for cough and shortness of breath.    Cardiovascular:  Negative for chest pain, palpitations and peripheral edema.   Gastrointestinal:  Negative for abdominal pain, constipation, diarrhea, heartburn, hematochezia and nausea.   Genitourinary:  Negative for dysuria, frequency, genital sores, hematuria, impotence, penile discharge and urgency.   Musculoskeletal:  Negative for arthralgias, joint swelling and myalgias.  "  Skin:  Negative for rash.   Neurological:  Negative for dizziness, weakness, headaches and paresthesias.   Psychiatric/Behavioral:  Negative for mood changes. The patient is not nervous/anxious.          OBJECTIVE:   /78   Pulse 62   Temp 97.6  F (36.4  C) (Temporal)   Ht 1.79 m (5' 10.47\")   Wt 111.1 kg (245 lb)   SpO2 96%   BMI 34.68 kg/m      Physical Exam  GENERAL: healthy, alert and no distress  NECK: no adenopathy, no asymmetry, masses, or scars and thyroid normal to palpation  RESP: lungs clear to auscultation - no rales, rhonchi or wheezes  CV: regular rate and rhythm, normal S1 S2, no S3 or S4, no murmur, click or rub, no peripheral edema and peripheral pulses strong  ABDOMEN: soft, nontender, no hepatosplenomegaly, no masses and bowel sounds normal  MS: no gross musculoskeletal defects noted, no edema  Rectal-normal symmetric smooth prostate not enlarged, no nodules      Diagnostic Test Results:  Labs reviewed in Epic    ASSESSMENT/PLAN:       ICD-10-CM    1. Annual physical exam  Z00.00       2. Gastric bypass status for obesity  Z98.84       3. Iron deficiency anemia secondary to inadequate dietary iron intake  D50.8         Annual topics discussed  Labs reviewed from a few months ago for gastric bypass.  Plan to increase vitamin D intake.  Increase iron intake.  Repeat in 6 don      Patient has been advised of split billing requirements and indicates understanding: Yes      COUNSELING:   Reviewed preventive health counseling, as reflected in patient instructions      BMI:   Estimated body mass index is 34.68 kg/m  as calculated from the following:    Height as of this encounter: 1.79 m (5' 10.47\").    Weight as of this encounter: 111.1 kg (245 lb).         He reports that he has quit smoking. He has never used smokeless tobacco.          Hakeem Rebolledo MD  [facility]  "

## 2024-10-29 ENCOUNTER — PATIENT OUTREACH (OUTPATIENT)
Dept: CARE COORDINATION | Facility: CLINIC | Age: 64
End: 2024-10-29
Payer: COMMERCIAL

## 2025-01-25 ENCOUNTER — HEALTH MAINTENANCE LETTER (OUTPATIENT)
Age: 65
End: 2025-01-25

## 2025-07-30 ENCOUNTER — PATIENT OUTREACH (OUTPATIENT)
Dept: CARE COORDINATION | Facility: CLINIC | Age: 65
End: 2025-07-30
Payer: COMMERCIAL